# Patient Record
Sex: MALE | Race: WHITE | HISPANIC OR LATINO | Employment: FULL TIME | URBAN - METROPOLITAN AREA
[De-identification: names, ages, dates, MRNs, and addresses within clinical notes are randomized per-mention and may not be internally consistent; named-entity substitution may affect disease eponyms.]

---

## 2017-05-24 ENCOUNTER — OFFICE VISIT (OUTPATIENT)
Dept: URGENT CARE | Facility: PHYSICIAN GROUP | Age: 27
End: 2017-05-24
Payer: COMMERCIAL

## 2017-05-24 VITALS
SYSTOLIC BLOOD PRESSURE: 122 MMHG | OXYGEN SATURATION: 94 % | TEMPERATURE: 97.3 F | HEART RATE: 79 BPM | HEIGHT: 70 IN | DIASTOLIC BLOOD PRESSURE: 74 MMHG

## 2017-05-24 DIAGNOSIS — M62.838 MUSCLE SPASM OF RIGHT SHOULDER: ICD-10-CM

## 2017-05-24 DIAGNOSIS — M77.8 RIGHT SHOULDER TENDINITIS: ICD-10-CM

## 2017-05-24 PROCEDURE — 99214 OFFICE O/P EST MOD 30 MIN: CPT | Performed by: NURSE PRACTITIONER

## 2017-05-24 RX ORDER — HYDROCODONE BITARTRATE AND ACETAMINOPHEN 5; 325 MG/1; MG/1
1-2 TABLET ORAL EVERY 4 HOURS PRN
Qty: 15 TAB | Refills: 0 | Status: SHIPPED | OUTPATIENT
Start: 2017-05-24 | End: 2019-07-01

## 2017-05-24 RX ORDER — CYCLOBENZAPRINE HCL 10 MG
10 TABLET ORAL 3 TIMES DAILY PRN
Qty: 30 TAB | Refills: 0 | Status: SHIPPED | OUTPATIENT
Start: 2017-05-24 | End: 2019-07-01

## 2017-05-24 RX ORDER — NAPROXEN 500 MG/1
500 TABLET ORAL 2 TIMES DAILY WITH MEALS
Qty: 60 TAB | Refills: 1 | Status: SHIPPED | OUTPATIENT
Start: 2017-05-24 | End: 2019-07-01

## 2017-05-24 ASSESSMENT — ENCOUNTER SYMPTOMS
FALLS: 0
FEVER: 0

## 2017-05-24 NOTE — MR AVS SNAPSHOT
"        Fortino Leblanc Freddy   2017 8:05 AM   Office Visit   MRN: [id number]    Department:  [facility]   Dept Phone:  [phone]    Description:  Male : 1990   Provider:  SRIKANTH Valdivia           Reason for Visit     Shoulder Pain right shoulder pain no injury x 1 week       Allergies as of 2017     No Known Allergies      You were diagnosed with     Right shoulder tendinitis   [7317309]       Muscle spasm of right shoulder   [[id number]]         Vital Signs     Blood Pressure Pulse Temperature Height Oxygen Saturation Smoking Status    122/74 mmHg 79 36.3 °C (97.3 °F) 1.778 m (5' 10\") 94% Never Smoker       Basic Information     Date Of Birth Sex Race Ethnicity Preferred Language    1990 Male  or   Origin (Eritrean,Sudanese,Danish,Ryan, etc) English      Health Maintenance        Date Due Completion Dates    IMM DTaP/Tdap/Td Vaccine (1 - Tdap) 2007 ---            Current Immunizations     No immunizations on file.      Below and/or attached are the medications your provider expects you to take. Review all of your home medications and newly ordered medications with your provider and/or pharmacist. Follow medication instructions as directed by your provider and/or pharmacist. Please keep your medication list with you and share with your provider. Update the information when medications are discontinued, doses are changed, or new medications (including over-the-counter products) are added; and carry medication information at all times in the event of emergency situations     Allergies:  No Known Allergies          Medications  Valid as of: May 24, 2017 -  8:47 AM    Generic Name Brand Name Tablet Size Instructions for use    Cyclobenzaprine HCl (Tab) FLEXERIL 10 MG Take 1 Tab by mouth 3 times a day as needed.        Hydrocodone-Acetaminophen (Tab) NORCO 5-325 MG Take 1-2 Tabs by mouth every 6 hours as needed.        Hydrocodone-Acetaminophen (Tab) " NORCO 5-325 MG Take 1-2 Tabs by mouth every four hours as needed.        Naproxen (Tab) NAPROSYN 500 MG Take 1 Tab by mouth 2 times a day, with meals.        Polymyxin B-Trimethoprim (Solution) POLYTRIM 33138-6.1 UNIT/ML-% Place 1 Drop in both eyes every 4 hours.        .                 Medicines prescribed today were sent to:     [facility] [address] [place], [place] - [address]    [address] [facility] [address]    Phone: [phone] Fax: [phone]    Open 24 Hours?: No      Medication refill instructions:       If your prescription bottle indicates you have medication refills left, it is not necessary to call your provider’s office. Please contact your pharmacy and they will refill your medication.    If your prescription bottle indicates you do not have any refills left, you may request refills at any time through one of the following ways: The online Vhoto system (except Urgent Care), by calling your provider’s office, or by asking your pharmacy to contact your provider’s office with a refill request. Medication refills are processed only during regular business hours and may not be available until the next business day. Your provider may request additional information or to have a follow-up visit with you prior to refilling your medication.   *Please Note: Medication refills are assigned a new Rx number when refilled electronically. Your pharmacy may indicate that no refills were authorized even though a new prescription for the same medication is available at the pharmacy. Please request the medicine by name with the pharmacy before contacting your provider for a refill.           Vhoto Access Code: [email or web address]  Expires: 6/23/2017  8:47 AM    Your email address is not on file at [email or web address].  Email Addresses are required for you to sign up for Vhoto, please contact [phone] to verify your personal information and to provide your email address prior to attempting to register for  Spiced Bitst.    Fortino Hayes  [address]  [place] [address]    MyChart  A secure, online tool to manage your health information     [employer]’s Spiced Bitst® is a secure, online tool that connects you to your personalized health information from the privacy of your home -- day or night - making it very easy for you to manage your healthcare. Once the activation process is completed, you can even access your medical information using the SpongeFish anthony, which is available for free in the Apple Anthony store or Google Play store.     To learn more about SpongeFish, visit [email or web address]    There are two levels of access available (as shown below):   My Chart Features  [facility] Primary Care Doctor [facility]  Specialists [facility]  Urgent  Care Non-[facility] Primary Care Doctor   Email your healthcare team securely and privately 24/7 X X X    Manage appointments: schedule your next appointment; view details of past/upcoming appointments X      Request prescription refills. X      View recent personal medical records, including lab and immunizations X X X X   View health record, including health history, allergies, medications X X X X   Read reports about your outpatient visits, procedures, consult and ER notes X X X X   See your discharge summary, which is a recap of your hospital and/or ER visit that includes your diagnosis, lab results, and care plan X X  X     How to register for SpongeFish:  Once your e-mail address has been verified, follow the following steps to sign up for SpongeFish.     1. Go to  [email or web address]  2. Click on the Sign Up Now box, which takes you to the New Member Sign Up page. You will need to provide the following information:  a. Enter your SpongeFish Access Code exactly as it appears at the top of this page. (You will not need to use this code after you’ve completed the sign-up process. If you do not sign up before the expiration date, you must request a new code.)   b. Enter your date of  birth.   c. Enter your home email address.   d. Click Submit, and follow the next screen’s instructions.  3. Create a vivit ID. This will be your vivit login ID and cannot be changed, so think of one that is secure and easy to remember.  4. Create a e-channelt password. You can change your password at any time.  5. Enter your Password Reset Question and Answer. This can be used at a later time if you forget your password.   6. Enter your e-mail address. This allows you to receive e-mail notifications when new information is available in vivit.  7. Click Sign Up. You can now view your health information.    For assistance activating your vivit account, call [phone]

## 2017-05-24 NOTE — PROGRESS NOTES
"Subjective:      Fortino Hayes is a 26 y.o. male who presents with Shoulder Pain            Shoulder Pain  This is a new problem. Episode onset: one week ago. Reports no mechanism of injury, feels like he slept wrong on his shoulder. The problem occurs constantly. The problem has been unchanged. Pertinent negatives include no fever. Associated symptoms comments: Right shoulder pain. The symptoms are aggravated by exertion (any movement of shoulder). He has tried acetaminophen, ice and heat for the symptoms. The treatment provided no relief.       Review of Systems   Constitutional: Negative for fever.   Musculoskeletal: Positive for joint pain (right shoulder). Negative for falls.   All other systems reviewed and are negative.    No past medical history on file. No past surgical history on file.   Social History     Social History   • Marital Status: Single     Spouse Name: N/A   • Number of Children: N/A   • Years of Education: N/A     Occupational History   • Not on file.     Social History Main Topics   • Smoking status: Never Smoker    • Smokeless tobacco: Not on file   • Alcohol Use: 2.4 oz/week     4 Cans of beer per week   • Drug Use: Not on file   • Sexual Activity: Not on file     Other Topics Concern   • Not on file     Social History Narrative          Objective:     /74 mmHg  Pulse 79  Temp(Src) 36.3 °C (97.3 °F)  Ht 1.778 m (5' 10\")  SpO2 94%     Physical Exam   Constitutional: He is oriented to person, place, and time. Vital signs are normal. He appears well-developed and well-nourished.   HENT:   Head: Normocephalic and atraumatic.   Eyes: EOM are normal. Pupils are equal, round, and reactive to light.   Neck: Trachea normal, normal range of motion and phonation normal. Neck supple.       Cardiovascular: Normal rate and regular rhythm.    Pulmonary/Chest: Effort normal.   Musculoskeletal:        Right shoulder: He exhibits decreased range of motion, tenderness, pain, spasm and decreased " strength. He exhibits no effusion, no crepitus and no deformity.   Positive drop arm test  Positive empty can test  Decreased strength against resistance  Decreased ROM in all planes  Distal CMS intact   Neurological: He is alert and oriented to person, place, and time. He has normal strength. No cranial nerve deficit or sensory deficit.   Skin: Skin is warm and dry.   Psychiatric: He has a normal mood and affect. His speech is normal and behavior is normal. Thought content normal.   Vitals reviewed.              Assessment/Plan:     1. Right shoulder tendinitis  - naproxen (NAPROSYN) 500 MG Tab; Take 1 Tab by mouth 2 times a day, with meals.  Dispense: 60 Tab; Refill: 1  - hydrocodone-acetaminophen (NORCO) 5-325 MG Tab per tablet; Take 1-2 Tabs by mouth every four hours as needed.  Dispense: 15 Tab; Refill: 0    2. Muscle spasm of right shoulder  - hydrocodone-acetaminophen (NORCO) 5-325 MG Tab per tablet; Take 1-2 Tabs by mouth every four hours as needed.  Dispense: 15 Tab; Refill: 0  - cyclobenzaprine (FLEXERIL) 10 MG Tab; Take 1 Tab by mouth 3 times a day as needed.  Dispense: 30 Tab; Refill: 0    Due to lack of mechanism of injury, I discussed with patient that I would like to try conservative management first and then imaging later   Ice and heat PRN  Daily shoulder stretches and ROM exercises  Discussed with him to try immobilization of the right shoulder for the next week to see if that improves discomfort  If his pain does not improve, instructed to return to  for imaging, he V/U  Supportive care, differential diagnoses, and indications for immediate follow-up discussed with patient.    Pathogenesis of diagnosis discussed including typical length and natural progression.      Instructed to return to  or nearest emergency department if symptoms fail to improve, for any change in condition, further concerns, or new concerning symptoms.  Patient states understanding of the plan of care and discharge  instructions.

## 2017-05-24 NOTE — Clinical Note
May 24, 2017         Patient: Fortino Hayes   YOB: 1990   Date of Visit: 5/24/2017           To Whom it May Concern:    Fortino Hayes was seen in my clinic on 5/24/2017. He may return to work on 5/24/2017.    If you have any questions or concerns, please don't hesitate to call.        Sincerely,           ERIC Valdivia.  Electronically Signed

## 2017-06-05 ENCOUNTER — OFFICE VISIT (OUTPATIENT)
Dept: URGENT CARE | Facility: PHYSICIAN GROUP | Age: 27
End: 2017-06-05
Payer: COMMERCIAL

## 2017-06-05 VITALS
BODY MASS INDEX: 28.83 KG/M2 | OXYGEN SATURATION: 95 % | WEIGHT: 201.4 LBS | DIASTOLIC BLOOD PRESSURE: 80 MMHG | RESPIRATION RATE: 16 BRPM | HEIGHT: 70 IN | TEMPERATURE: 98.1 F | HEART RATE: 97 BPM | SYSTOLIC BLOOD PRESSURE: 126 MMHG

## 2017-06-05 DIAGNOSIS — R06.02 SOB (SHORTNESS OF BREATH): ICD-10-CM

## 2017-06-05 PROCEDURE — 99214 OFFICE O/P EST MOD 30 MIN: CPT | Performed by: FAMILY MEDICINE

## 2017-06-05 RX ORDER — ALBUTEROL SULFATE 90 UG/1
2 AEROSOL, METERED RESPIRATORY (INHALATION) EVERY 6 HOURS PRN
Qty: 8.5 G | Refills: 0 | Status: SHIPPED | OUTPATIENT
Start: 2017-06-05 | End: 2019-07-01

## 2017-06-05 RX ORDER — IPRATROPIUM BROMIDE AND ALBUTEROL SULFATE 2.5; .5 MG/3ML; MG/3ML
3 SOLUTION RESPIRATORY (INHALATION) ONCE
Status: COMPLETED | OUTPATIENT
Start: 2017-06-05 | End: 2017-06-05

## 2017-06-05 RX ORDER — ALBUTEROL SULFATE 2.5 MG/3ML
2.5 SOLUTION RESPIRATORY (INHALATION) ONCE
Status: COMPLETED | OUTPATIENT
Start: 2017-06-05 | End: 2017-06-05

## 2017-06-05 RX ORDER — AZITHROMYCIN 250 MG/1
TABLET, FILM COATED ORAL
Qty: 6 TAB | Refills: 0 | Status: SHIPPED | OUTPATIENT
Start: 2017-06-05 | End: 2019-07-01

## 2017-06-05 RX ADMIN — ALBUTEROL SULFATE 2.5 MG: 2.5 SOLUTION RESPIRATORY (INHALATION) at 18:29

## 2017-06-05 RX ADMIN — IPRATROPIUM BROMIDE AND ALBUTEROL SULFATE 3 ML: 2.5; .5 SOLUTION RESPIRATORY (INHALATION) at 17:53

## 2017-06-28 ASSESSMENT — ENCOUNTER SYMPTOMS
FEVER: 0
ABDOMINAL PAIN: 0
WHEEZING: 1
SPUTUM PRODUCTION: 1
SORE THROAT: 0
SHORTNESS OF BREATH: 1

## 2017-06-28 NOTE — PROGRESS NOTES
"Subjective:      Fortino Hayes is a 26 y.o. male who presents with Shortness of Breath            Shortness of Breath  This is a new problem. The current episode started in the past 7 days. The problem occurs constantly. The problem has been rapidly worsening. Associated symptoms include sputum production and wheezing. Pertinent negatives include no abdominal pain, fever or sore throat.       Review of Systems   Constitutional: Negative for fever.   HENT: Negative for sore throat.    Respiratory: Positive for sputum production, shortness of breath and wheezing.    Gastrointestinal: Negative for abdominal pain.     No Known Allergies      Objective:     /80 mmHg  Pulse 97  Temp(Src) 36.7 °C (98.1 °F)  Resp 16  Ht 1.778 m (5' 10\")  Wt 91.354 kg (201 lb 6.4 oz)  BMI 28.90 kg/m2  SpO2 95%     Physical Exam   Constitutional: He is oriented to person, place, and time. He appears well-developed and well-nourished. No distress.   HENT:   Head: Normocephalic and atraumatic.   Eyes: Conjunctivae and EOM are normal. Pupils are equal, round, and reactive to light.   Cardiovascular: Normal rate and regular rhythm.    No murmur heard.  Pulmonary/Chest: Effort normal. No respiratory distress. He has wheezes. He has no rales.   Abdominal: Soft. He exhibits no distension. There is no tenderness.   Neurological: He is alert and oriented to person, place, and time. He has normal reflexes. No sensory deficit.   Skin: Skin is warm and dry.   Psychiatric: He has a normal mood and affect.               Assessment/Plan:     1. SOB (shortness of breath)  Differential diagnosis, natural history, supportive care, and indications for immediate follow-up discussed.   - ipratropium-albuterol (DUONEB) nebulizer solution 3 mL; 3 mL by Nebulization route Once.  - albuterol 108 (90 BASE) MCG/ACT Aero Soln inhalation aerosol; Inhale 2 Puffs by mouth every 6 hours as needed for Shortness of Breath.  Dispense: 8.5 g; Refill: 0  - " azithromycin (ZITHROMAX) 250 MG Tab; Take 2 tablets by mouth on day one. Take one tablet by mouth the remaining days until gone  Dispense: 6 Tab; Refill: 0  - albuterol (PROVENTIL) 2.5mg/3ml nebulizer solution 2.5 mg; 3 mL by Nebulization route Once.

## 2017-12-23 ENCOUNTER — OFFICE VISIT (OUTPATIENT)
Dept: URGENT CARE | Facility: PHYSICIAN GROUP | Age: 27
End: 2017-12-23
Payer: COMMERCIAL

## 2017-12-23 ENCOUNTER — HOSPITAL ENCOUNTER (OUTPATIENT)
Dept: RADIOLOGY | Facility: MEDICAL CENTER | Age: 27
End: 2017-12-23
Attending: NURSE PRACTITIONER
Payer: COMMERCIAL

## 2017-12-23 VITALS
HEART RATE: 107 BPM | DIASTOLIC BLOOD PRESSURE: 82 MMHG | BODY MASS INDEX: 30.06 KG/M2 | TEMPERATURE: 97.8 F | SYSTOLIC BLOOD PRESSURE: 130 MMHG | RESPIRATION RATE: 18 BRPM | OXYGEN SATURATION: 96 % | HEIGHT: 70 IN | WEIGHT: 210 LBS

## 2017-12-23 DIAGNOSIS — R50.9 FEVER, UNSPECIFIED FEVER CAUSE: ICD-10-CM

## 2017-12-23 DIAGNOSIS — J20.9 ACUTE BRONCHITIS, UNSPECIFIED ORGANISM: ICD-10-CM

## 2017-12-23 DIAGNOSIS — J45.20 MILD INTERMITTENT ASTHMA WITHOUT COMPLICATION: ICD-10-CM

## 2017-12-23 LAB
FLUAV+FLUBV AG SPEC QL IA: NORMAL
INT CON NEG: NORMAL
INT CON POS: NORMAL

## 2017-12-23 PROCEDURE — 99214 OFFICE O/P EST MOD 30 MIN: CPT | Performed by: NURSE PRACTITIONER

## 2017-12-23 PROCEDURE — 71020 DX-CHEST-2 VIEWS: CPT

## 2017-12-23 PROCEDURE — 87804 INFLUENZA ASSAY W/OPTIC: CPT | Performed by: NURSE PRACTITIONER

## 2017-12-23 RX ORDER — ALBUTEROL SULFATE 90 UG/1
2 AEROSOL, METERED RESPIRATORY (INHALATION) EVERY 6 HOURS PRN
Qty: 8.5 G | Refills: 2 | Status: SHIPPED | OUTPATIENT
Start: 2017-12-23 | End: 2019-07-01

## 2017-12-23 RX ORDER — PREDNISONE 20 MG/1
TABLET ORAL
Qty: 10 TAB | Refills: 0 | Status: SHIPPED | OUTPATIENT
Start: 2017-12-23 | End: 2018-09-19

## 2017-12-23 ASSESSMENT — ENCOUNTER SYMPTOMS
FEVER: 0
NAUSEA: 0
SORE THROAT: 0
WHEEZING: 0
SHORTNESS OF BREATH: 1
SPUTUM PRODUCTION: 1
COUGH: 1
HEADACHES: 1
CHILLS: 1
ORTHOPNEA: 0
MYALGIAS: 1
EYE DISCHARGE: 0
DIARRHEA: 0

## 2017-12-23 NOTE — PROGRESS NOTES
Subjective:      Fortino Hayes is a 27 y.o. male who presents with Flu Like Symptoms (x3days cough fever, body aches)            HPI This is a new problem. 27 year old male presenting with cough and congestion as well as chest tightness and chills. He denies fever today. He has no sore throat, nausea or vomiting. Has some chest wall pain on left when coughing. Denies wheezing but has shortness of breath. He has been taking albuterol with no relief. History relevant for asthma.  Patient has no known allergies.  Current Outpatient Prescriptions on File Prior to Visit   Medication Sig Dispense Refill   • albuterol 108 (90 BASE) MCG/ACT Aero Soln inhalation aerosol Inhale 2 Puffs by mouth every 6 hours as needed for Shortness of Breath. 8.5 g 0   • azithromycin (ZITHROMAX) 250 MG Tab Take 2 tablets by mouth on day one. Take one tablet by mouth the remaining days until gone 6 Tab 0   • naproxen (NAPROSYN) 500 MG Tab Take 1 Tab by mouth 2 times a day, with meals. 60 Tab 1   • hydrocodone-acetaminophen (NORCO) 5-325 MG Tab per tablet Take 1-2 Tabs by mouth every four hours as needed. 15 Tab 0   • cyclobenzaprine (FLEXERIL) 10 MG Tab Take 1 Tab by mouth 3 times a day as needed. 30 Tab 0   • polymixin-trimethoprim (POLYTRIM) 51378-4.1 UNIT/ML-% Solution Place 1 Drop in both eyes every 4 hours. 10 mL 0   • hydrocodone-acetaminophen (NORCO) 5-325 MG Tab per tablet Take 1-2 Tabs by mouth every 6 hours as needed. 15 Tab 0     No current facility-administered medications on file prior to visit.      Social History     Social History   • Marital status: Single     Spouse name: N/A   • Number of children: N/A   • Years of education: N/A     Occupational History   • Not on file.     Social History Main Topics   • Smoking status: Never Smoker   • Smokeless tobacco: Not on file   • Alcohol use 2.4 oz/week     4 Cans of beer per week   • Drug use: Unknown   • Sexual activity: Not on file     Other Topics Concern   • Not on file  "    Social History Narrative   • No narrative on file     family history is not on file.      Review of Systems   Constitutional: Positive for chills and malaise/fatigue. Negative for fever.   HENT: Positive for congestion. Negative for sore throat.    Eyes: Negative for discharge.   Respiratory: Positive for cough, sputum production and shortness of breath. Negative for wheezing.    Cardiovascular: Negative for chest pain and orthopnea.   Gastrointestinal: Negative for diarrhea and nausea.   Musculoskeletal: Positive for myalgias.   Neurological: Positive for headaches.   Endo/Heme/Allergies: Negative for environmental allergies.          Objective:     /82   Pulse (!) 107   Temp 36.6 °C (97.8 °F)   Resp 18   Ht 1.778 m (5' 10\")   Wt 95.3 kg (210 lb)   SpO2 96%   BMI 30.13 kg/m²      Physical Exam   Constitutional: He is oriented to person, place, and time. He appears well-developed and well-nourished. No distress.   HENT:   Head: Normocephalic and atraumatic.   Right Ear: External ear and ear canal normal. Tympanic membrane is not injected and not perforated. No middle ear effusion.   Left Ear: External ear and ear canal normal. Tympanic membrane is not injected and not perforated.  No middle ear effusion.   Nose: Mucosal edema present.   Mouth/Throat: Posterior oropharyngeal erythema present. No oropharyngeal exudate.   Eyes: Conjunctivae are normal. Right eye exhibits no discharge. Left eye exhibits no discharge.   Neck: Normal range of motion. Neck supple.   Cardiovascular: Normal rate, regular rhythm and normal heart sounds.    No murmur heard.  Pulmonary/Chest: Effort normal and breath sounds normal. No respiratory distress. He has no wheezes.   Musculoskeletal: Normal range of motion.   Normal movement of all 4 extremities.   Lymphadenopathy:     He has no cervical adenopathy.        Right: No supraclavicular adenopathy present.        Left: No supraclavicular adenopathy present.   Neurological: " He is alert and oriented to person, place, and time. Gait normal.   Skin: Skin is warm and dry.   Psychiatric: He has a normal mood and affect. His behavior is normal. Thought content normal.               Assessment/Plan:     1. Fever, unspecified fever cause  POCT Influenza A/B    DX-CHEST-2 VIEWS   2. Acute bronchitis, unspecified organism  albuterol 108 (90 Base) MCG/ACT Aero Soln inhalation aerosol    predniSONE (DELTASONE) 20 MG Tab   3. Mild intermittent asthma without complication       X-ray and flu negative.   This is a viral event, no indication for antibiotics.   Refill on albuterol and given 5 days of prednisone.  Reassurance that chest wall pain likely fatigue of muscle.  Differential diagnosis, natural history, supportive care, and indications for immediate follow-up discussed at length.

## 2018-09-19 ENCOUNTER — OFFICE VISIT (OUTPATIENT)
Dept: URGENT CARE | Facility: CLINIC | Age: 28
End: 2018-09-19
Payer: COMMERCIAL

## 2018-09-19 VITALS
HEIGHT: 70 IN | HEART RATE: 87 BPM | RESPIRATION RATE: 13 BRPM | BODY MASS INDEX: 30.78 KG/M2 | TEMPERATURE: 97.9 F | DIASTOLIC BLOOD PRESSURE: 82 MMHG | OXYGEN SATURATION: 94 % | WEIGHT: 215 LBS | SYSTOLIC BLOOD PRESSURE: 124 MMHG

## 2018-09-19 DIAGNOSIS — J02.9 EXUDATIVE PHARYNGITIS: ICD-10-CM

## 2018-09-19 DIAGNOSIS — B27.90 INFECTIOUS MONONUCLEOSIS WITHOUT COMPLICATION, INFECTIOUS MONONUCLEOSIS DUE TO UNSPECIFIED ORGANISM: Primary | ICD-10-CM

## 2018-09-19 LAB
HETEROPH AB SER QL LA: POSITIVE
INT CON NEG: NEGATIVE
INT CON NEG: NEGATIVE
INT CON POS: POSITIVE
INT CON POS: POSITIVE
S PYO AG THROAT QL: NEGATIVE

## 2018-09-19 PROCEDURE — 86308 HETEROPHILE ANTIBODY SCREEN: CPT | Performed by: PHYSICIAN ASSISTANT

## 2018-09-19 PROCEDURE — 99214 OFFICE O/P EST MOD 30 MIN: CPT | Performed by: PHYSICIAN ASSISTANT

## 2018-09-19 PROCEDURE — 87880 STREP A ASSAY W/OPTIC: CPT | Performed by: PHYSICIAN ASSISTANT

## 2018-09-19 RX ORDER — METHYLPREDNISOLONE 4 MG/1
TABLET ORAL
Qty: 21 TAB | Refills: 0 | Status: SHIPPED | OUTPATIENT
Start: 2018-09-19 | End: 2019-07-01

## 2018-09-19 RX ORDER — AMOXICILLIN AND CLAVULANATE POTASSIUM 875; 125 MG/1; MG/1
1 TABLET, FILM COATED ORAL 2 TIMES DAILY
Qty: 14 TAB | Refills: 0 | Status: SHIPPED | OUTPATIENT
Start: 2018-09-19 | End: 2018-09-19 | Stop reason: CLARIF

## 2018-09-19 NOTE — PROGRESS NOTES
Subjective:      Pt is a 28 y.o. male who presents with Sore Throat            HPI  PT presents to  clinic today complaining of sore throat, fevers, chills, body aches, watery eyes, pressure in ears, cough, fatigue, runny nose. PT denies CP, SOB, NVD, abdominal pain, joint pain. PT states these symptoms began around 3 days ago. PT states the pain is a 7/10 with swallowing, aching in nature and worse at night.  Pt has not taken any RX medications for this condition. The pt's medication list, problem list, and allergies have been evaluated and reviewed during today's visit.      PMH:  Negative per pt.      PSH:  Negative per pt.      Fam Hx:  the patient's family history is not pertinent to their current complaint      Soc HX:  Social History     Social History   • Marital status: Single     Spouse name: N/A   • Number of children: N/A   • Years of education: N/A     Occupational History   • Not on file.     Social History Main Topics   • Smoking status: Never Smoker   • Smokeless tobacco: Never Used   • Alcohol use 2.4 oz/week     4 Cans of beer per week   • Drug use: Unknown   • Sexual activity: Not on file     Other Topics Concern   • Not on file     Social History Narrative   • No narrative on file         Medications:    Current Outpatient Prescriptions:   •  amoxicillin-clavulanate (AUGMENTIN) 875-125 MG Tab, Take 1 Tab by mouth 2 times a day for 7 days., Disp: 14 Tab, Rfl: 0  •  MethylPREDNISolone (MEDROL DOSEPAK) 4 MG Tablet Therapy Pack, Use as directed, Disp: 21 Tab, Rfl: 0  •  albuterol 108 (90 Base) MCG/ACT Aero Soln inhalation aerosol, Inhale 2 Puffs by mouth every 6 hours as needed for Shortness of Breath., Disp: 8.5 g, Rfl: 2  •  albuterol 108 (90 BASE) MCG/ACT Aero Soln inhalation aerosol, Inhale 2 Puffs by mouth every 6 hours as needed for Shortness of Breath., Disp: 8.5 g, Rfl: 0  •  azithromycin (ZITHROMAX) 250 MG Tab, Take 2 tablets by mouth on day one. Take one tablet by mouth the remaining days  "until gone, Disp: 6 Tab, Rfl: 0  •  naproxen (NAPROSYN) 500 MG Tab, Take 1 Tab by mouth 2 times a day, with meals., Disp: 60 Tab, Rfl: 1  •  hydrocodone-acetaminophen (NORCO) 5-325 MG Tab per tablet, Take 1-2 Tabs by mouth every four hours as needed., Disp: 15 Tab, Rfl: 0  •  cyclobenzaprine (FLEXERIL) 10 MG Tab, Take 1 Tab by mouth 3 times a day as needed., Disp: 30 Tab, Rfl: 0  •  polymixin-trimethoprim (POLYTRIM) 87740-6.1 UNIT/ML-% Solution, Place 1 Drop in both eyes every 4 hours., Disp: 10 mL, Rfl: 0  •  hydrocodone-acetaminophen (NORCO) 5-325 MG Tab per tablet, Take 1-2 Tabs by mouth every 6 hours as needed., Disp: 15 Tab, Rfl: 0      Allergies:  Patient has no known allergies.    ROS  Constitutional: Positive for chills, fevers, body aches and malaise/fatigue.   HENT: Positive for congestion and sore throat. Negative for ear pain.    Eyes: Negative for blurred vision, double vision and photophobia.   Respiratory: NEG for cough  Cardiovascular: Negative for chest pain and palpitations.   Gastrointestinal: Negative for nausea, vomiting, abdominal pain, diarrhea and constipation.   Genitourinary: Negative for dysuria and flank pain.   Musculoskeletal: Negative for falls and myalgias.   Skin: Negative for itching and rash.   Neurological: Positive for headaches. Negative for dizziness and tingling.   Endo/Heme/Allergies: Does not bruise/bleed easily.   Psychiatric/Behavioral: Negative for depression. The patient is not nervous/anxious.             Objective:     /82 (BP Location: Right arm, Patient Position: Sitting, BP Cuff Size: Adult)   Pulse 87   Temp 36.6 °C (97.9 °F) (Temporal)   Resp 13   Ht 1.778 m (5' 10\")   Wt 97.5 kg (215 lb)   SpO2 94%   BMI 30.85 kg/m²      Physical Exam      Constitutional: PT is oriented to person, place, and time. PT appears well-developed and well-nourished. No distress.   HENT:   Head: Normocephalic and atraumatic.   Right Ear: Hearing, tympanic membrane, external " ear and ear canal normal.   Left Ear: Hearing, tympanic membrane, external ear and ear canal normal.   Nose: Mucosal edema, rhinorrhea and sinus tenderness present. Right sinus exhibits frontal sinus tenderness. Left sinus exhibits frontal sinus tenderness.   Mouth/Throat: Uvula is midline. Mucous membranes are pale. Posterior oropharyngeal edema and posterior oropharyngeal erythema with exudate noted on exam.   Eyes: Conjunctivae normal and EOM are normal. Pupils are equal, round, and reactive to light.   Neck: Normal range of motion. Neck supple. No thyromegaly present.   Cardiovascular: Normal rate, regular rhythm, normal heart sounds and intact distal pulses.  Exam reveals no gallop and no friction rub.    No murmur heard.  Pulmonary/Chest: Effort normal and breath sounds normal. No respiratory distress. PT has no wheezes. PT has no rales. PT exhibits no tenderness.   Abdominal: Soft. Bowel sounds are normal. PT exhibits no distension and no mass. There is no tenderness. There is no rebound and no guarding.   Musculoskeletal: Normal range of motion. PT exhibits no edema and no tenderness.   Lymphadenopathy:     PT has no cervical adenopathy.   Neurological: PT is alert and oriented to person, place, and time. PT displays normal reflexes. No cranial nerve deficit. PT exhibits normal muscle tone. Coordination normal.   Skin: Skin is warm and dry. No rash noted. No erythema.   Psychiatric: PT has a normal mood and affect. PT behavior is normal. Judgment and thought content normal.          Assessment/Plan:     1. Infectious mononucleosis without complication, infectious mononucleosis due to unspecified organism    2, Exudative pharyngitis  Back-up abx if symptoms do not improve in 2-3 days. PT on clinical presentation has exudate on oropharynx and it's possible beyond the strep A testing that this could be a different type of strep (C/G) or A. haemolyticum       - MethylPREDNISolone (MEDROL DOSEPAK) 4 MG Tablet  Therapy Pack; Use as directed  Dispense: 21 Tab; Refill: 0  - POCT Rapid Strep A-->NEG  - POCT Mononucleosis (mono)-->POS    Rest, fluids encouraged.  OTC decongestant for congestion  Note given for work.  AVS with medical info given.  Pt was in full understanding and agreement with the plan.  Follow-up as needed if symptoms worsen or fail to improve.

## 2018-09-19 NOTE — LETTER
September 19, 2018       Patient: Fortino Hayes   YOB: 1990   Date of Visit: 9/19/2018         To Whom It May Concern:    It is my medical opinion that Fortino Hayes may be excused from work for the dates of 9/19/18-9/21/18.      If you have any questions or concerns, please don't hesitate to call [phone]          Sincerely,          Earnest Muro P.A.-C.  Electronically Signed

## 2018-09-19 NOTE — PATIENT INSTRUCTIONS
Infectious Mononucleosis  Infectious mononucleosis is a viral infection. It is often referred to as “mono.” It causes symptoms that affect various areas of the body, including the throat, upper air passages, and lymph glands. The liver or spleen may also be affected.  The virus spreads from person to person (is contagious) through close contact. The illness is usually not serious, and it typically goes away in 2-4 weeks without treatment. In rare cases, symptoms can be more severe and last longer, sometimes up to several months.  What are the causes?  This condition is commonly caused by the Nadir-Barr virus. This virus spreads through:  · Contact with an infected person’s saliva or other bodily fluids, often through:  ¨ Kissing.  ¨ Sexual contact.  ¨ Coughing.  ¨ Sneezing.  · Sharing utensils or drinking glasses that were recently used by an infected person.  · Blood transfusions.  · Organ transplantation.  What increases the risk?  You are more likely to develop this condition if:  · You are 15-24 years old.  What are the signs or symptoms?  Symptoms of this condition usually appear 4-6 weeks after infection. Symptoms may develop slowly and occur at different times. Common symptoms include:  · Sore throat.  · Headache.  · Extreme fatigue.  · Muscle aches.  · Swollen glands.  · Fever.  · Poor appetite.  · Rash.  Other symptoms include:  · Enlarged liver or spleen.  · Nausea.  · Abdominal pain.  How is this diagnosed?  This condition may be diagnosed based on:  · Your medical history.  · Your symptoms.  · A physical exam.  · Blood tests to confirm the diagnosis.  How is this treated?  There is no cure for this condition. Infectious mononucleosis usually goes away on its own with time. Treatment can help relieve symptoms and may include:  · Taking medicines to relieve pain and fever.  · Drinking plenty of fluids.  · Getting a lot of rest.  · Medicine (corticosteroids)to reduce swelling. This may be used if swelling  in the throat causes breathing or swallowing problems.  In some severe cases, treatment has to be given in a hospital.  Follow these instructions at home:  Medicines  · Take over-the-counter and prescription medicines only as told by your health care provider.  · Do not take ampicillin or amoxicillin. This may cause a rash.  · If you are under 18, do not take aspirin because of the association with Reye syndrome.  Activity  · Rest as needed.  · Do not participate in any of the following activities until your health care provider approves:  ¨ Contact sports. You may need to wait at least a month before participating in sports.  ¨ Exercise that requires a lot of energy.  ¨ Heavy lifting.  · Gradually resume your normal activities after your fever is gone, or when your health care provider tells you that you can. Be sure to rest when you get tired.  General instructions  · Avoid kissing or sharing utensils or drinking glasses until your health care provider tells you that you are no longer contagious.  · Drink enough fluid to keep your urine clear or pale yellow.  · Do not drink alcohol.  · If you have a sore throat:  ¨ Gargle with a salt-water mixture 3-4 times a day or as needed. To make a salt-water mixture, completely dissolve ½-1 tsp of salt in 1 cup of warm water.  ¨ Eat soft foods. Cold foods such as ice cream or frozen ice pops can soothe a sore throat.  ¨ Try sucking on hard candy.  · Wash your hands often with soap and water to avoid spreading the infection. If soap and water are not available, use hand .  How is this prevented?  · Avoid contact with people who are infected with mononucleosis. An infected person may not always appear ill, but he or she can still spread the virus.  · Avoid sharing utensils, drinking glasses, or toothbrushes.  · Wash your hands frequently with soap and water. If soap and water are not available, use hand .  · Use the inside of your elbow to cover your mouth  "when coughing or sneezing.  Contact a health care provider if:  · Your fever is not gone after 10 days.  · You have swollen lymph nodes that are not back to normal after 4 weeks.  · Your activity level is not back to normal after 2 months.  · Your skin or the white parts of your eyes turn yellow (jaundice).  · You have constipation. This may mean that you have:  ¨ Fewer bowel movements in a week than normal.  ¨ Difficulty having a bowel movement.  ¨ Stools that are dry, hard, or larger than normal.  Get help right away if:  · You have severe pain in your abdomen or shoulder.  · You are drooling.  · You have trouble swallowing.  · You have trouble breathing.  · You develop a stiff neck.  · You develop a severe headache.  · You cannot stop vomiting.  · You have jerky movements that you cannot control (seizures).  · You are confused.  · You have trouble with balance.  · Your nose or gums begin to bleed.  · You have signs of dehydration. These may include:  ¨ Weakness.  ¨ Sunken eyes.  ¨ Pale skin.  ¨ Dry mouth.  ¨ Rapid breathing or pulse.  Summary  · Infectious mononucleosis, or \"mono,\" is an infection caused by the Nadir-Barr virus.  · The virus that causes this condition is spread through bodily fluids. The virus is most commonly spread by kissing or sharing drinks or utensils with an infected person.  · You are more likely to develop this infection if you are 15-24 years old.  · Symptoms of this condition can include sore throat, headache, fever, swollen glands, muscle aches, extreme fatigue, and swollen liver or spleen.  · There is no cure for this condition. The goal of treatment is to help relieve symptoms. Treatment may include drinking plenty of water, getting a lot of rest, and taking pain relievers.  This information is not intended to replace advice given to you by your health care provider. Make sure you discuss any questions you have with your health care provider.  Document Released: 12/15/2001 " Document Revised: 09/05/2017 Document Reviewed: 09/05/2017  [employer] Patient Education © 2017 [employer]

## 2018-12-24 ENCOUNTER — OFFICE VISIT (OUTPATIENT)
Dept: URGENT CARE | Facility: MEDICAL CENTER | Age: 28
End: 2018-12-24
Payer: COMMERCIAL

## 2018-12-24 VITALS
SYSTOLIC BLOOD PRESSURE: 122 MMHG | TEMPERATURE: 97.4 F | DIASTOLIC BLOOD PRESSURE: 78 MMHG | HEART RATE: 104 BPM | BODY MASS INDEX: 30.92 KG/M2 | HEIGHT: 70 IN | WEIGHT: 216 LBS | OXYGEN SATURATION: 93 %

## 2018-12-24 DIAGNOSIS — J02.9 SORE THROAT: ICD-10-CM

## 2018-12-24 DIAGNOSIS — R68.89 FLU-LIKE SYMPTOMS: ICD-10-CM

## 2018-12-24 LAB
FLUAV+FLUBV AG SPEC QL IA: NORMAL
INT CON NEG: NORMAL
INT CON NEG: NORMAL
INT CON POS: NORMAL
INT CON POS: NORMAL
S PYO AG THROAT QL: NORMAL

## 2018-12-24 PROCEDURE — 87804 INFLUENZA ASSAY W/OPTIC: CPT | Performed by: NURSE PRACTITIONER

## 2018-12-24 PROCEDURE — 99214 OFFICE O/P EST MOD 30 MIN: CPT | Performed by: NURSE PRACTITIONER

## 2018-12-24 PROCEDURE — 87880 STREP A ASSAY W/OPTIC: CPT | Performed by: NURSE PRACTITIONER

## 2018-12-24 RX ORDER — OSELTAMIVIR PHOSPHATE 75 MG/1
75 CAPSULE ORAL 2 TIMES DAILY
Qty: 10 CAP | Refills: 0 | Status: SHIPPED | OUTPATIENT
Start: 2018-12-24 | End: 2018-12-29

## 2018-12-24 ASSESSMENT — ENCOUNTER SYMPTOMS
HEADACHES: 1
TROUBLE SWALLOWING: 1
CHILLS: 1
COUGH: 1
SORE THROAT: 1
SWOLLEN GLANDS: 1
FEVER: 1

## 2018-12-24 NOTE — PROGRESS NOTES
Subjective:      Fortino Hayes is a 28 y.o. male who presents with Pharyngitis (chills yesterday, 103 fever, throat pain)    History reviewed. No pertinent past medical history.  Social History     Social History   • Marital status: Single     Spouse name: N/A   • Number of children: N/A   • Years of education: N/A     Occupational History   • Not on file.     Social History Main Topics   • Smoking status: Never Smoker   • Smokeless tobacco: Never Used   • Alcohol use 2.4 oz/week     4 Cans of beer per week      Comment: occasionally   • Drug use: Unknown   • Sexual activity: Not on file     Other Topics Concern   • Not on file     Social History Narrative   • No narrative on file     Family History   Problem Relation Age of Onset   • Heart Disease Neg Hx    • Stroke Neg Hx      Allergies: Patient has no known allergies.      Patient is a 28-year-old male who presents today with complaint of sore throat and fever, aches, and chills.  Symptoms started over the last 48 hours.  Reports T-max of 103.  Has had cough and nasal congestion as well.  He denies any shortness of breath or difficulty breathing.  No other ill family members at this time.        Pharyngitis    This is a new problem. The current episode started in the past 7 days. The problem has been unchanged. Neither side of throat is experiencing more pain than the other. The maximum temperature recorded prior to his arrival was 103 - 104 F. Associated symptoms include congestion, coughing, ear pain, headaches, swollen glands and trouble swallowing. He has tried nothing for the symptoms. The treatment provided no relief.       Review of Systems   Constitutional: Positive for chills, fever and malaise/fatigue.   HENT: Positive for congestion, ear pain, sore throat and trouble swallowing.    Respiratory: Positive for cough.    Skin: Negative.    Neurological: Positive for headaches.   All other systems reviewed and are negative.         Objective:     BP  "122/78 (BP Location: Left arm, Patient Position: Sitting, BP Cuff Size: Adult)   Pulse (!) 104   Temp 36.3 °C (97.4 °F) (Temporal)   Ht 1.778 m (5' 10\")   Wt 98 kg (216 lb)   SpO2 93%   BMI 30.99 kg/m²      Physical Exam   Constitutional: He is oriented to person, place, and time. He appears well-developed and well-nourished.   HENT:   Head: Normocephalic.   Right Ear: External ear normal.   Left Ear: External ear normal.   Nose: Nose normal.   Clear postnasal drainage, oropharynx is slightly red.   Eyes: Pupils are equal, round, and reactive to light. Conjunctivae and EOM are normal.   Neck: Normal range of motion. Neck supple.   Cardiovascular: Normal rate and regular rhythm.    Pulmonary/Chest: Effort normal and breath sounds normal.   Musculoskeletal: Normal range of motion.   Neurological: He is alert and oriented to person, place, and time.   Skin: Skin is warm and dry. Capillary refill takes less than 2 seconds. No rash noted.   Psychiatric: He has a normal mood and affect. His behavior is normal. Judgment and thought content normal.   Vitals reviewed.      poct strep: negative   poct influenza: negative           Assessment/Plan:     1. Sore throat  2. Flu-like symptoms     -tylenol/motrin PRN  -tamiflu  -warm saltwater gargles  -push fluids  -humidifier  -ER precautions for respiratory distress       "

## 2019-07-01 ENCOUNTER — OFFICE VISIT (OUTPATIENT)
Dept: URGENT CARE | Facility: MEDICAL CENTER | Age: 29
End: 2019-07-01
Payer: COMMERCIAL

## 2019-07-01 VITALS
DIASTOLIC BLOOD PRESSURE: 78 MMHG | OXYGEN SATURATION: 93 % | SYSTOLIC BLOOD PRESSURE: 116 MMHG | WEIGHT: 216 LBS | HEART RATE: 82 BPM | TEMPERATURE: 98.7 F | RESPIRATION RATE: 16 BRPM | HEIGHT: 70 IN | BODY MASS INDEX: 30.92 KG/M2

## 2019-07-01 DIAGNOSIS — J22 LOWER RESP. TRACT INFECTION: ICD-10-CM

## 2019-07-01 PROCEDURE — 99214 OFFICE O/P EST MOD 30 MIN: CPT | Performed by: NURSE PRACTITIONER

## 2019-07-01 RX ORDER — BENZONATATE 200 MG/1
200 CAPSULE ORAL 3 TIMES DAILY PRN
Qty: 30 CAP | Refills: 0 | Status: SHIPPED | OUTPATIENT
Start: 2019-07-01 | End: 2019-10-16

## 2019-07-01 RX ORDER — AZITHROMYCIN 250 MG/1
TABLET, FILM COATED ORAL
Qty: 6 TAB | Refills: 0 | Status: SHIPPED | OUTPATIENT
Start: 2019-07-01 | End: 2019-10-16

## 2019-07-01 RX ORDER — METHYLPREDNISOLONE 4 MG/1
4 TABLET ORAL DAILY
Qty: 1 KIT | Refills: 0 | Status: SHIPPED | OUTPATIENT
Start: 2019-07-01 | End: 2019-10-16

## 2019-07-01 RX ORDER — AMOXICILLIN AND CLAVULANATE POTASSIUM 500; 125 MG/1; MG/1
1 TABLET, FILM COATED ORAL 3 TIMES DAILY
COMMUNITY
End: 2019-07-01

## 2019-07-01 ASSESSMENT — ENCOUNTER SYMPTOMS
CHILLS: 0
FEVER: 0
DIZZINESS: 0
SORE THROAT: 1
WHEEZING: 1
BLURRED VISION: 0
VOMITING: 0
STRIDOR: 0
CONSTIPATION: 0
WEIGHT LOSS: 0
ABDOMINAL PAIN: 0
COUGH: 1
PALPITATIONS: 0
DIARRHEA: 0
HEADACHES: 0
SPUTUM PRODUCTION: 1
SWEATS: 0
DOUBLE VISION: 0
NAUSEA: 0
MUSCULOSKELETAL NEGATIVE: 1

## 2019-07-01 NOTE — PROGRESS NOTES
Subjective:   Fortino Hayes is a 28 y.o. male who presents for Cough (chest congestion, throat is scratchy, x 3 weeks)        Cough   This is a new problem. The current episode started 1 to 4 weeks ago. The problem has been unchanged. The problem occurs every few minutes. The cough is productive of sputum. Associated symptoms include nasal congestion, a sore throat and wheezing. Pertinent negatives include no chest pain, chills, ear congestion, ear pain, fever, headaches, postnasal drip, rash, sweats or weight loss. The symptoms are aggravated by lying down. He has tried OTC cough suppressant for the symptoms. The treatment provided mild relief. His past medical history is significant for asthma. There is no history of environmental allergies or pneumonia.        Review of Systems   Constitutional: Negative for chills, fever and weight loss.   HENT: Positive for congestion and sore throat. Negative for ear discharge, ear pain and postnasal drip.    Eyes: Negative for blurred vision and double vision.   Respiratory: Positive for cough, sputum production and wheezing. Negative for stridor.    Cardiovascular: Negative for chest pain and palpitations.   Gastrointestinal: Negative for abdominal pain, constipation, diarrhea, nausea and vomiting.   Musculoskeletal: Negative.    Skin: Negative.  Negative for itching and rash.   Neurological: Negative for dizziness and headaches.   Endo/Heme/Allergies: Negative for environmental allergies.   All other systems reviewed and are negative.    PMH:  has no past medical history of Cancer (HCC).  MEDS:   Current Outpatient Prescriptions:   •  azithromycin (ZITHROMAX) 250 MG Tab, Take two tabs po day one followed by one tab po day two through five with food, Disp: 6 Tab, Rfl: 0  •  methylPREDNISolone (MEDROL DOSEPAK) 4 MG Tablet Therapy Pack, Take 1 Tab by mouth every day., Disp: 1 Kit, Rfl: 0  •  benzonatate (TESSALON) 200 MG capsule, Take 1 Cap by mouth 3 times a day as  "needed., Disp: 30 Cap, Rfl: 0  ALLERGIES: No Known Allergies  SURGHX: History reviewed. No pertinent surgical history.  SOCHX:  reports that he has never smoked. He has never used smokeless tobacco. He reports that he drinks about 2.4 oz of alcohol per week .  FH: Family history was reviewed, no pertinent findings to report     Objective:   /78   Pulse 82   Temp 37.1 °C (98.7 °F)   Resp 16   Ht 1.778 m (5' 10\")   Wt 98 kg (216 lb)   SpO2 93%   BMI 30.99 kg/m²   Physical Exam   Constitutional: He is oriented to person, place, and time. He appears well-developed and well-nourished. No distress.   HENT:   Head: Normocephalic.   Right Ear: Hearing, tympanic membrane and ear canal normal. Tympanic membrane is not erythematous. No middle ear effusion.   Left Ear: Hearing, tympanic membrane and ear canal normal. Tympanic membrane is not erythematous.  No middle ear effusion.   Nose: Nose normal. No rhinorrhea. Right sinus exhibits no maxillary sinus tenderness and no frontal sinus tenderness. Left sinus exhibits no maxillary sinus tenderness and no frontal sinus tenderness.   Mouth/Throat: Oropharynx is clear and moist and mucous membranes are normal. No posterior oropharyngeal edema or posterior oropharyngeal erythema.   Eyes: Pupils are equal, round, and reactive to light. Conjunctivae, EOM and lids are normal.   Neck: Normal range of motion. No thyromegaly present.   Cardiovascular: Normal rate, regular rhythm and normal heart sounds.    Pulmonary/Chest: Effort normal. No accessory muscle usage. No apnea, no tachypnea and no bradypnea. No respiratory distress. He has no decreased breath sounds. He has wheezes in the right upper field and the left upper field.   Lymphadenopathy:        Head (right side): Submandibular adenopathy present. No tonsillar adenopathy present.        Head (left side): No submandibular and no tonsillar adenopathy present.   Neurological: He is alert and oriented to person, place, " and time.   Skin: Skin is warm and dry. No rash noted. He is not diaphoretic.   Psychiatric: He has a normal mood and affect. His speech is normal and behavior is normal. Judgment and thought content normal.   Vitals reviewed.        Assessment/Plan:   Assessment    1. Lower resp. tract infection  - azithromycin (ZITHROMAX) 250 MG Tab; Take two tabs po day one followed by one tab po day two through five with food  Dispense: 6 Tab; Refill: 0  - methylPREDNISolone (MEDROL DOSEPAK) 4 MG Tablet Therapy Pack; Take 1 Tab by mouth every day.  Dispense: 1 Kit; Refill: 0  - benzonatate (TESSALON) 200 MG capsule; Take 1 Cap by mouth 3 times a day as needed.  Dispense: 30 Cap; Refill: 0    With wheezing in office and patient states with albuterol inhaler at home that he can go home and use for wheezing. At home instructions for inhaler use discussed.  Patient encouraged to increase clear liquid intake    Differential diagnosis, natural history, supportive care, and indications for immediate follow-up discussed.

## 2019-10-16 ENCOUNTER — OFFICE VISIT (OUTPATIENT)
Dept: URGENT CARE | Facility: MEDICAL CENTER | Age: 29
End: 2019-10-16
Payer: COMMERCIAL

## 2019-10-16 ENCOUNTER — HOSPITAL ENCOUNTER (OUTPATIENT)
Dept: RADIOLOGY | Facility: MEDICAL CENTER | Age: 29
End: 2019-10-16
Attending: FAMILY MEDICINE
Payer: COMMERCIAL

## 2019-10-16 VITALS
HEIGHT: 71 IN | HEART RATE: 87 BPM | DIASTOLIC BLOOD PRESSURE: 80 MMHG | SYSTOLIC BLOOD PRESSURE: 122 MMHG | BODY MASS INDEX: 30.66 KG/M2 | RESPIRATION RATE: 18 BRPM | OXYGEN SATURATION: 95 % | WEIGHT: 219 LBS | TEMPERATURE: 97.5 F

## 2019-10-16 DIAGNOSIS — J45.901 MILD ASTHMA WITH EXACERBATION, UNSPECIFIED WHETHER PERSISTENT: ICD-10-CM

## 2019-10-16 DIAGNOSIS — R68.89 FLU-LIKE SYMPTOMS: ICD-10-CM

## 2019-10-16 LAB
FLUAV+FLUBV AG SPEC QL IA: NEGATIVE
INT CON NEG: NORMAL
INT CON POS: NORMAL

## 2019-10-16 PROCEDURE — 99214 OFFICE O/P EST MOD 30 MIN: CPT | Mod: 25 | Performed by: FAMILY MEDICINE

## 2019-10-16 PROCEDURE — 94640 AIRWAY INHALATION TREATMENT: CPT | Performed by: FAMILY MEDICINE

## 2019-10-16 PROCEDURE — 71046 X-RAY EXAM CHEST 2 VIEWS: CPT

## 2019-10-16 PROCEDURE — 87804 INFLUENZA ASSAY W/OPTIC: CPT | Performed by: FAMILY MEDICINE

## 2019-10-16 RX ORDER — BENZONATATE 100 MG/1
100 CAPSULE ORAL 3 TIMES DAILY PRN
Qty: 30 CAP | Refills: 0 | Status: SHIPPED | OUTPATIENT
Start: 2019-10-16 | End: 2020-07-14

## 2019-10-16 RX ORDER — PREDNISONE 20 MG/1
TABLET ORAL
Qty: 10 TAB | Refills: 0 | Status: SHIPPED | OUTPATIENT
Start: 2019-10-16 | End: 2020-07-14

## 2019-10-16 RX ORDER — ALBUTEROL SULFATE 90 UG/1
2 AEROSOL, METERED RESPIRATORY (INHALATION) EVERY 4 HOURS PRN
Qty: 1 INHALER | Refills: 0 | Status: SHIPPED | OUTPATIENT
Start: 2019-10-16 | End: 2020-07-14

## 2019-10-16 RX ORDER — ALBUTEROL SULFATE 2.5 MG/3ML
2.5 SOLUTION RESPIRATORY (INHALATION) ONCE
Status: COMPLETED | OUTPATIENT
Start: 2019-10-16 | End: 2019-10-16

## 2019-10-16 RX ADMIN — ALBUTEROL SULFATE 2.5 MG: 2.5 SOLUTION RESPIRATORY (INHALATION) at 10:41

## 2019-10-16 NOTE — LETTER
October 16, 2019         Patient: Fortino Silvestre   YOB: 1990   Date of Visit: 10/16/2019           To Whom it May Concern:    Fortino Silvestre was seen in my clinic on 10/16/2019. He may return to work on 10/19/2019..    If you have any questions or concerns, please don't hesitate to call.        Sincerely,           Max Carias M.D.  Electronically Signed

## 2019-10-16 NOTE — PROGRESS NOTES
"Subjective:      Fortino Silvestre is a 29 y.o. male who presents with Fever (x2days fever, congestion, headaches)            This is a new problem.  29-year-old male who reports history of mild intermittent asthma presenting for 3-day history of cough congestion and fever, achy and fatigue.  He has been using albuterol inhaler, last use yesterday.  He has noticed some wheezing.  No other ill contacts or travel history.      Review of Systems   All other systems reviewed and are negative.         Objective:     /80   Pulse 87   Temp 36.4 °C (97.5 °F) (Temporal)   Resp 18   Ht 1.803 m (5' 11\")   Wt 99.3 kg (219 lb)   SpO2 95%   BMI 30.54 kg/m²      Physical Exam   Constitutional: He is oriented to person, place, and time. He appears well-developed and well-nourished.  Non-toxic appearance. No distress.   HENT:   Head: Normocephalic and atraumatic.   Right Ear: Tympanic membrane, external ear and ear canal normal.   Left Ear: Tympanic membrane, external ear and ear canal normal.   Nose: Rhinorrhea present.   Mouth/Throat: Uvula is midline and oropharynx is clear and moist. No oral lesions. No trismus in the jaw. No uvula swelling. No oropharyngeal exudate, posterior oropharyngeal edema, posterior oropharyngeal erythema or tonsillar abscesses. No tonsillar exudate.   Eyes: Conjunctivae are normal.   Neck: Neck supple.   Cardiovascular: Normal rate and regular rhythm. Exam reveals no gallop and no friction rub.   No murmur heard.  Pulmonary/Chest: Effort normal. No stridor. No respiratory distress. He has decreased breath sounds in the right lower field. He has wheezes. He has rhonchi. He has no rales.   Wheezing and rhonchi significantly improved after neb.   Lymphadenopathy:     He has no cervical adenopathy.   Neurological: He is alert and oriented to person, place, and time.   Skin: Skin is warm. No rash noted. He is not diaphoretic. No erythema. No pallor.   Psychiatric: He has a normal mood and " affect.        Chest x-ray was negative for acute abnormalities on my read       Assessment/Plan:   ASSESSMENT:PLAN:  1. Mild asthma with exacerbation, unspecified whether persistent  - predniSONE (DELTASONE) 20 MG Tab; Take 40mg daily x 5 days  Dispense: 10 Tab; Refill: 0  - albuterol 108 (90 Base) MCG/ACT Aero Soln inhalation aerosol; Inhale 2 Puffs by mouth every four hours as needed (wheezing).  Dispense: 1 Inhaler; Refill: 0    2. Flu-like symptoms  - albuterol (PROVENTIL) 2.5mg/3ml nebulizer solution 2.5 mg  - POCT Influenza A/B  - DX-CHEST-2 VIEWS; Future  - benzonatate (TESSALON) 100 MG Cap; Take 1 Cap by mouth 3 times a day as needed for Cough.  Dispense: 30 Cap; Refill: 0      Likely a viral illness, no evidence of pneumonia on chest x-ray.  Mild asthma exacerbation  Refilled albuterol.  Oral steroid daily for 5 days.  OTC antipyretic discussed.  Plan per orders and instructions  Warning signs reviewed

## 2020-07-14 ENCOUNTER — OFFICE VISIT (OUTPATIENT)
Dept: MEDICAL GROUP | Facility: PHYSICIAN GROUP | Age: 30
End: 2020-07-14
Payer: COMMERCIAL

## 2020-07-14 VITALS
WEIGHT: 214.8 LBS | RESPIRATION RATE: 16 BRPM | TEMPERATURE: 98.8 F | OXYGEN SATURATION: 97 % | DIASTOLIC BLOOD PRESSURE: 78 MMHG | SYSTOLIC BLOOD PRESSURE: 126 MMHG | HEART RATE: 88 BPM | HEIGHT: 71 IN | BODY MASS INDEX: 30.07 KG/M2

## 2020-07-14 DIAGNOSIS — N50.89 TESTICULAR LUMP: ICD-10-CM

## 2020-07-14 DIAGNOSIS — G43.101 MIGRAINE WITH AURA AND WITH STATUS MIGRAINOSUS, NOT INTRACTABLE: ICD-10-CM

## 2020-07-14 PROBLEM — G43.109 MIGRAINE WITH AURA: Status: ACTIVE | Noted: 2020-07-14

## 2020-07-14 PROCEDURE — 99214 OFFICE O/P EST MOD 30 MIN: CPT | Performed by: FAMILY MEDICINE

## 2020-07-14 RX ORDER — AMITRIPTYLINE HYDROCHLORIDE 10 MG/1
10 TABLET, FILM COATED ORAL
Qty: 30 TAB | Refills: 2 | Status: SHIPPED | OUTPATIENT
Start: 2020-07-14 | End: 2020-08-05

## 2020-07-14 ASSESSMENT — PATIENT HEALTH QUESTIONNAIRE - PHQ9: CLINICAL INTERPRETATION OF PHQ2 SCORE: 0

## 2020-07-14 NOTE — PROGRESS NOTES
"Subjective:     Chief Complaint   Patient presents with   • Establish Care     migraines        HPI:   Fortino presents today to discuss the following.  Prior PCP: None.    Migraine with aura  This is a chronic condition.    Symptom onset: He has had migraines for many years  Current symptoms: Last migraine was a couple of days. He gets them 4x weekly.   Since onset symptoms are: Unchanged  Modifying factors: Mom has migraines too   Treatments tried: Advil and rest in a dark room   Associated symptoms: Denies any      Testicular lump  This is a chronic problem  The patient noted a lump to his right testicle 2 weeks ago  It is laterally to the testicle  It is asymptomatic   He is sexually active with his wife  Denies any penile discharge.       No past medical history on file.    Social History     Tobacco Use   • Smoking status: Never Smoker   • Smokeless tobacco: Never Used   Substance Use Topics   • Alcohol use: Yes     Alcohol/week: 2.4 oz     Types: 4 Cans of beer per week     Comment: occasionally   • Drug use: Not on file       Current Outpatient Medications Ordered in Epic   Medication Sig Dispense Refill   • amitriptyline (ELAVIL) 10 MG Tab Take 1 Tab by mouth every bedtime. 30 Tab 2     No current Epic-ordered facility-administered medications on file.        Allergies:  Patient has no known allergies.    Health Maintenance: Completed    ROS:  Gen: no fevers/chills, no changes in weight  Eyes: no changes in vision  ENT: no sore throat, no hearing loss, no bloody nose  Pulm: no sob, no cough  CV: no chest pain, no palpitations  GI: no nausea/vomiting, no diarrhea      Objective:     Exam:  /78 (BP Location: Left arm, Patient Position: Sitting, BP Cuff Size: Adult)   Pulse 88   Temp 37.1 °C (98.8 °F) (Temporal)   Resp 16   Ht 1.803 m (5' 11\")   Wt 97.4 kg (214 lb 12.8 oz)   SpO2 97%   BMI 29.96 kg/m²  Body mass index is 29.96 kg/m².    Constitutional: Alert, no distress, well-groomed.  Skin: Warm, " dry, good turgor, no rashes in visible areas.  Eye: Equal, round and reactive, conjunctiva clear, lids normal.  ENMT: Lips without lesions, good dentition, moist mucous membranes.  Neck: Trachea midline, no masses, no thyromegaly.  Respiratory: Unlabored respiratory effort, no cough.  MSK: Normal gait, moves all extremities.  : Inspection of his testicular region shows a right scrotal mass measuring approximately 1 cm in diameter.  It is movable.  Questionably subcutaneous in nature.  Neuro: Grossly non-focal.   Psych: Alert and oriented x3, normal affect and mood.          Assessment & Plan:     29 y.o. male with the following -     1. Migraine with aura and with status migrainosus, not intractable  This is a chronic, unchanged condition.  The patient has a longstanding history of migraines.  He gets them very frequently up to 4 times weekly.  He only takes ibuprofen and rest for treatment.  He has not tried any other medications.  Given the high frequency of his migraines I would like to do a trial of amitriptyline to prevent his headaches.  The patient is in agreement with plan.  We will commence with amitriptyline 10 mg nightly.  - amitriptyline (ELAVIL) 10 MG Tab; Take 1 Tab by mouth every bedtime.  Dispense: 30 Tab; Refill: 2    2. Testicular lump  This is a new problem.  The patient has been complaining of a right testicular lump.  On physical exam I question whether this is subcutaneous or an actual testicular lump.  I would like to pursue a scrotal ultrasound for further analysis.  Return precautions were given today.  - [id number]; Future        Return in about 3 months (around 10/14/2020) for FU on testicle lump.    Please note that this dictation was created using voice recognition software. I have made every reasonable attempt to correct obvious errors, but I expect that there are errors of grammar and possibly content that I did not discover before finalizing the note.

## 2020-07-14 NOTE — ASSESSMENT & PLAN NOTE
This is a chronic condition.    Symptom onset: He has had migraines for many years  Current symptoms: Last migraine was a couple of days. He gets them 4x weekly.   Since onset symptoms are: Unchanged  Modifying factors: Mom has migraines too   Treatments tried: Advil and rest in a dark room   Associated symptoms: Denies any

## 2020-07-14 NOTE — ASSESSMENT & PLAN NOTE
This is a chronic problem  The patient noted a lump to his right testicle 2 weeks ago  It is laterally to the testicle  It is asymptomatic   He is sexually active with his wife  Denies any penile discharge.

## 2020-07-27 ENCOUNTER — HOSPITAL ENCOUNTER (OUTPATIENT)
Dept: RADIOLOGY | Facility: MEDICAL CENTER | Age: 30
End: 2020-07-27
Attending: FAMILY MEDICINE
Payer: COMMERCIAL

## 2020-07-27 DIAGNOSIS — N50.89 TESTICULAR LUMP: ICD-10-CM

## 2020-07-27 PROCEDURE — 76870 US EXAM SCROTUM: CPT

## 2020-08-05 ENCOUNTER — OFFICE VISIT (OUTPATIENT)
Dept: URGENT CARE | Facility: PHYSICIAN GROUP | Age: 30
End: 2020-08-05
Payer: COMMERCIAL

## 2020-08-05 VITALS
SYSTOLIC BLOOD PRESSURE: 126 MMHG | WEIGHT: 214.8 LBS | TEMPERATURE: 97.6 F | RESPIRATION RATE: 16 BRPM | HEIGHT: 71 IN | BODY MASS INDEX: 30.07 KG/M2 | OXYGEN SATURATION: 95 % | HEART RATE: 78 BPM | DIASTOLIC BLOOD PRESSURE: 80 MMHG

## 2020-08-05 DIAGNOSIS — G43.109 MIGRAINE WITH AURA AND WITHOUT STATUS MIGRAINOSUS, NOT INTRACTABLE: ICD-10-CM

## 2020-08-05 DIAGNOSIS — R11.0 NAUSEA: ICD-10-CM

## 2020-08-05 PROCEDURE — 99214 OFFICE O/P EST MOD 30 MIN: CPT | Mod: 25 | Performed by: NURSE PRACTITIONER

## 2020-08-05 RX ORDER — ONDANSETRON 4 MG/1
4 TABLET, ORALLY DISINTEGRATING ORAL EVERY 6 HOURS PRN
Qty: 15 TAB | Refills: 0 | Status: SHIPPED | OUTPATIENT
Start: 2020-08-05 | End: 2020-10-06

## 2020-08-05 RX ORDER — KETOROLAC TROMETHAMINE 30 MG/ML
30 INJECTION, SOLUTION INTRAMUSCULAR; INTRAVENOUS ONCE
Status: COMPLETED | OUTPATIENT
Start: 2020-08-05 | End: 2020-08-05

## 2020-08-05 RX ADMIN — KETOROLAC TROMETHAMINE 30 MG: 30 INJECTION, SOLUTION INTRAMUSCULAR; INTRAVENOUS at 15:16

## 2020-08-05 ASSESSMENT — ENCOUNTER SYMPTOMS
PHOTOPHOBIA: 1
CHILLS: 0
EYE DISCHARGE: 0
WHEEZING: 0
BLURRED VISION: 0
DIZZINESS: 0
SENSORY CHANGE: 0
SHORTNESS OF BREATH: 0
EYE PAIN: 0
HEADACHES: 1
DOUBLE VISION: 0
FEVER: 0
COUGH: 0
TINGLING: 0

## 2020-08-05 ASSESSMENT — VISUAL ACUITY: OU: 1

## 2020-08-05 NOTE — PROGRESS NOTES
Subjective:   Fortino Silvestre  is a 30 y.o. male who presents for Dizziness (headaches, was seen at the doctor two weeks ago and the medication is not helping, feels like its getting worse, x2 weeks )        30-year-old male patient reports urgent care for recurrent migraine headaches with aura specifically on the right-hand side.  Patient states he recently went to his primary care provider who started him on amitriptyline and he needs a referral but does not feel like they are helpful and might have even worsened his headaches.  Patient states he has them 4 times a week goes to bed and wakes up early morning with a very bad headache, nausea with associated vomiting and aura on the right-hand side.  Patient has not seen neurology for this.  Usually takes Tylenol and ibuprofen with no relief of symptoms.  Denies visual changes during headache.  States they are usually located right above his right eye as well as in the middle f his right temple area.  Patient states he did get his eyes checked and is not straining at this point.  Does not need glasses.    Review of Systems   Constitutional: Negative for chills, fever and malaise/fatigue.   HENT: Negative for ear pain and tinnitus.    Eyes: Positive for photophobia. Negative for blurred vision, double vision, pain and discharge.   Respiratory: Negative for cough, shortness of breath and wheezing.    Neurological: Positive for headaches. Negative for dizziness, tingling and sensory change.     History reviewed. No pertinent past medical history. History reviewed. No pertinent surgical history.   Social History     Socioeconomic History   • Marital status: Single     Spouse name: Not on file   • Number of children: Not on file   • Years of education: Not on file   • Highest education level: Not on file   Occupational History   • Not on file   Social Needs   • Financial resource strain: Not on file   • Food insecurity     Worry: Not on file     Inability: Not  "on file   • Transportation needs     Medical: Not on file     Non-medical: Not on file   Tobacco Use   • Smoking status: Never Smoker   • Smokeless tobacco: Never Used   Substance and Sexual Activity   • Alcohol use: Yes     Alcohol/week: 2.4 oz     Types: 4 Cans of beer per week     Comment: occasionally   • Drug use: Not on file   • Sexual activity: Not on file   Lifestyle   • Physical activity     Days per week: Not on file     Minutes per session: Not on file   • Stress: Not on file   Relationships   • Social connections     Talks on phone: Not on file     Gets together: Not on file     Attends [employer] service: Not on file     Active member of club or organization: Not on file     Attends meetings of clubs or organizations: Not on file     Relationship status: Not on file   • Intimate partner violence     Fear of current or ex partner: Not on file     Emotionally abused: Not on file     Physically abused: Not on file     Forced sexual activity: Not on file   Other Topics Concern   • Not on file   Social History Narrative   • Not on file    Patient has no known allergies.       Objective:   /80 (BP Location: Right arm, Patient Position: Sitting, BP Cuff Size: Adult)   Pulse 78   Temp 36.4 °C (97.6 °F) (Temporal)   Resp 16   Ht 1.803 m (5' 11\")   Wt 97.4 kg (214 lb 12.8 oz)   SpO2 95%   BMI 29.96 kg/m²   Physical Exam  Vitals signs reviewed.   Constitutional:       Appearance: Normal appearance.   HENT:      Right Ear: Tympanic membrane, ear canal and external ear normal.      Left Ear: Tympanic membrane, ear canal and external ear normal.      Nose: Nose normal.      Mouth/Throat:      Mouth: Mucous membranes are moist.   Eyes:      General: Lids are normal. Vision grossly intact. Gaze aligned appropriately.      Extraocular Movements: Extraocular movements intact.      Conjunctiva/sclera: Conjunctivae normal.      Pupils: Pupils are equal, round, and reactive to light.      Visual Fields: Right " eye visual fields normal and left eye visual fields normal.   Cardiovascular:      Rate and Rhythm: Normal rate and regular rhythm.      Heart sounds: Normal heart sounds.   Pulmonary:      Effort: Pulmonary effort is normal.      Breath sounds: Normal breath sounds.   Skin:     General: Skin is warm.   Neurological:      General: No focal deficit present.      Mental Status: He is alert.      GCS: GCS eye subscore is 4. GCS verbal subscore is 5. GCS motor subscore is 6.      Cranial Nerves: Cranial nerves are intact.      Sensory: Sensation is intact.      Motor: Motor function is intact.      Coordination: Coordination is intact.      Gait: Gait is intact.   Psychiatric:         Mood and Affect: Mood normal.         Behavior: Behavior normal.         Thought Content: Thought content normal.         Judgment: Judgment normal.           Assessment/Plan:      1. Migraine with aura and without status migrainosus, not intractable  - ketorolac (TORADOL) injection 30 mg  - REFERRAL TO NEUROLOGY    2. Nausea  - ondansetron (ZOFRAN ODT) 4 MG TABLET DISPERSIBLE; Take 1 Tab by mouth every 6 hours as needed for Nausea.  Dispense: 15 Tab; Refill: 0    Discussed physical examination findings are consistent with chronic migraine with aura without status migrainosus is not intractable.  Will provide patient with referral to neurology for further work-up and evaluation.  Did offer to refill amitriptyline but patient states he does not feel he gets helpful.  Advised him to take over-the-counter 800 mg of ibuprofen as well as Excedrin Migraine when he feels one coming on also to increase his fluids using electrolyte averages and possibly drink a caffeinated beverage.  Also provided patient with Zofran for nausea.  Also provided patient with a Toradol injection IM for headache relief.  Supportive care, differential diagnoses, and indications for immediate follow-up discussed with patient.    Pathogenesis of diagnosis discussed  including typical length and natural progression. Patient expresses understanding and agrees to plan.    Instructed patient to return to clinic for worsening symptoms or symptoms that persist for 7 to 10 days     Please note that this dictation was created using voice recognition software. I have made every reasonable attempt to correct obvious errors, but I expect that there are errors of grammar and possibly content that I did not discover before finalizing the note.

## 2020-09-17 ENCOUNTER — PATIENT MESSAGE (OUTPATIENT)
Dept: MEDICAL GROUP | Facility: PHYSICIAN GROUP | Age: 30
End: 2020-09-17

## 2020-09-17 DIAGNOSIS — Z20.822 CLOSE EXPOSURE TO COVID-19 VIRUS: ICD-10-CM

## 2020-10-06 ENCOUNTER — OFFICE VISIT (OUTPATIENT)
Dept: URGENT CARE | Facility: PHYSICIAN GROUP | Age: 30
End: 2020-10-06
Payer: COMMERCIAL

## 2020-10-06 VITALS
HEART RATE: 77 BPM | SYSTOLIC BLOOD PRESSURE: 130 MMHG | HEIGHT: 70 IN | RESPIRATION RATE: 16 BRPM | WEIGHT: 216 LBS | TEMPERATURE: 97.5 F | OXYGEN SATURATION: 99 % | BODY MASS INDEX: 30.92 KG/M2 | DIASTOLIC BLOOD PRESSURE: 80 MMHG

## 2020-10-06 DIAGNOSIS — G43.009 MIGRAINE WITHOUT AURA AND WITHOUT STATUS MIGRAINOSUS, NOT INTRACTABLE: ICD-10-CM

## 2020-10-06 DIAGNOSIS — R11.2 NAUSEA AND VOMITING, INTRACTABILITY OF VOMITING NOT SPECIFIED, UNSPECIFIED VOMITING TYPE: ICD-10-CM

## 2020-10-06 PROCEDURE — 99214 OFFICE O/P EST MOD 30 MIN: CPT | Performed by: NURSE PRACTITIONER

## 2020-10-06 RX ORDER — KETOROLAC TROMETHAMINE 30 MG/ML
30 INJECTION, SOLUTION INTRAMUSCULAR; INTRAVENOUS ONCE
Status: COMPLETED | OUTPATIENT
Start: 2020-10-06 | End: 2020-10-06

## 2020-10-06 RX ORDER — ONDANSETRON 4 MG/1
4 TABLET, ORALLY DISINTEGRATING ORAL EVERY 6 HOURS PRN
Qty: 15 TAB | Refills: 0 | Status: SHIPPED | OUTPATIENT
Start: 2020-10-06 | End: 2020-10-15

## 2020-10-06 RX ADMIN — KETOROLAC TROMETHAMINE 30 MG: 30 INJECTION, SOLUTION INTRAMUSCULAR; INTRAVENOUS at 12:02

## 2020-10-06 ASSESSMENT — ENCOUNTER SYMPTOMS
DIZZINESS: 0
MIGRAINE HEADACHES: 1
HEADACHES: 1
FEVER: 0
EYE PAIN: 0
NAUSEA: 1
VOMITING: 1
WEIGHT LOSS: 0
SHORTNESS OF BREATH: 0
COUGH: 0
ABDOMINAL PAIN: 0
WHEEZING: 0
DIARRHEA: 0
PHOTOPHOBIA: 0
DOUBLE VISION: 0
BLURRED VISION: 0

## 2020-10-06 ASSESSMENT — VISUAL ACUITY: OU: 1

## 2020-10-06 NOTE — PROGRESS NOTES
Subjective:   Fortino Silvestre  is a 30 y.o. male who presents for Headache (Terrible headaches )        Headache   This is a recurrent problem. The problem occurs intermittently. The problem has been unchanged. The pain is located in the right unilateral, frontal, retro-orbital and temporal region. The pain does not radiate. The pain quality is similar to prior headaches. The quality of the pain is described as sharp and stabbing. Associated symptoms include nausea and vomiting. Pertinent negatives include no abdominal pain, blurred vision, coughing, dizziness, ear pain, eye pain, fever, hearing loss, photophobia, tinnitus or weight loss. Nothing aggravates the symptoms. His past medical history is significant for migraine headaches.      30-year-old nontoxic-appearing male reports urgent care for recurrent problem.  Was seen in the urgent careFor similar symptoms on 8-5-2020 and advised to follow-up with neurology and referral was placed.  Patient states that the referral department never called him although it looks like a letter was sent but patient states he never got it.  Still currently taking amitriptyline which he does not feel like is helping he still having right-sided sharp piercing migraines without auras to the right side of his face and above his right eye.  Has had his eyes checked and states that everything is fine went to the chiropractor and got adjusted and his symptoms are still persisting with at least 16 headaches a month.  When patient has a headache he treats with ibuprofen, Coca-Cola with no relief of symptoms.  Patient states he also usually wakes up in the middle of the night with these gets very hot and sweaty with associated nausea, vomits and then he is without pain for a day or so.    Review of Systems   Constitutional: Negative for fever, malaise/fatigue and weight loss.   HENT: Negative for congestion, ear pain, hearing loss and tinnitus.    Eyes: Negative for blurred  vision, double vision, photophobia and pain.   Respiratory: Negative for cough, shortness of breath and wheezing.    Gastrointestinal: Positive for nausea and vomiting. Negative for abdominal pain and diarrhea.   Neurological: Positive for headaches. Negative for dizziness.     History reviewed. No pertinent past medical history. History reviewed. No pertinent surgical history.   Social History     Socioeconomic History   • Marital status: Single     Spouse name: Not on file   • Number of children: Not on file   • Years of education: Not on file   • Highest education level: Not on file   Occupational History   • Not on file   Social Needs   • Financial resource strain: Not on file   • Food insecurity     Worry: Not on file     Inability: Not on file   • Transportation needs     Medical: Not on file     Non-medical: Not on file   Tobacco Use   • Smoking status: Never Smoker   • Smokeless tobacco: Never Used   Substance and Sexual Activity   • Alcohol use: Yes     Alcohol/week: 2.4 oz     Types: 4 Cans of beer per week     Comment: occasionally   • Drug use: Not on file   • Sexual activity: Not on file   Lifestyle   • Physical activity     Days per week: Not on file     Minutes per session: Not on file   • Stress: Not on file   Relationships   • Social connections     Talks on phone: Not on file     Gets together: Not on file     Attends Muslim service: Not on file     Active member of club or organization: Not on file     Attends meetings of clubs or organizations: Not on file     Relationship status: Not on file   • Intimate partner violence     Fear of current or ex partner: Not on file     Emotionally abused: Not on file     Physically abused: Not on file     Forced sexual activity: Not on file   Other Topics Concern   • Not on file   Social History Narrative   • Not on file    Patient has no known allergies.       Objective:   /80 (BP Location: Left arm, Patient Position: Sitting, BP Cuff Size: Adult)    "Pulse 77   Temp 36.4 °C (97.5 °F) (Temporal)   Resp 16   Ht 1.778 m (5' 10\")   Wt 98 kg (216 lb)   SpO2 99%   BMI 30.99 kg/m²   Physical Exam  Vitals signs reviewed.   Constitutional:       Appearance: Normal appearance.   HENT:      Right Ear: Tympanic membrane, ear canal and external ear normal.      Left Ear: Tympanic membrane, ear canal and external ear normal.      Mouth/Throat:      Mouth: Mucous membranes are moist.   Eyes:      General: Lids are normal. Vision grossly intact. Gaze aligned appropriately.      Extraocular Movements: Extraocular movements intact.      Conjunctiva/sclera: Conjunctivae normal.      Pupils: Pupils are equal, round, and reactive to light.      Visual Fields: Right eye visual fields normal and left eye visual fields normal.   Cardiovascular:      Rate and Rhythm: Normal rate and regular rhythm.      Heart sounds: Normal heart sounds.   Pulmonary:      Effort: Pulmonary effort is normal.      Breath sounds: Normal breath sounds.   Skin:     General: Skin is warm.   Neurological:      Mental Status: He is alert and oriented to person, place, and time.      GCS: GCS eye subscore is 4. GCS verbal subscore is 5. GCS motor subscore is 6.      Cranial Nerves: Cranial nerves are intact.      Sensory: Sensation is intact.      Motor: Motor function is intact.      Coordination: Coordination is intact.      Gait: Gait is intact.   Psychiatric:         Mood and Affect: Mood normal.         Behavior: Behavior normal.         Thought Content: Thought content normal.         Judgment: Judgment normal.           Assessment/Plan:   1. Migraine without aura and without status migrainosus, not intractable  - ketorolac (TORADOL) injection 30 mg  - ondansetron (ZOFRAN ODT) 4 MG TABLET DISPERSIBLE; Take 1 Tab by mouth every 6 hours as needed for Nausea.  Dispense: 15 Tab; Refill: 0    2. Nausea and vomiting, intractability of vomiting not specified, unspecified vomiting type  - ketorolac (TORADOL) " injection 30 mg  - ondansetron (ZOFRAN ODT) 4 MG TABLET DISPERSIBLE; Take 1 Tab by mouth every 6 hours as needed for Nausea.  Dispense: 15 Tab; Refill: 0    Discussed physical examination findings as well as ongoing migraine headaches needs to be worked up with both primary and neurology.  Provided patient with contact information for the referral department to call and schedule an appointment.  Advised him that if it takes more than 3 months I would like him to contact me via my chart and I will talk to the referral department to try to get him in sooner elsewhere outside of [facility].  Discussed abortive care including continuation of ibuprofen, Coca-Cola, and suggested to add electrolyte enriched beverages as well as Excedrin Migraine.  Also advised patient to follow-up with primary care as soon as he can because he needs to have a discussion regarding possibility of changing prophylactic migraine medication.    Supportive care, differential diagnoses, and indications for immediate follow-up discussed with patient.    Pathogenesis of diagnosis discussed including typical length and natural progression. Patient expresses understanding and agrees to plan.    Instructed patient to return to clinic for worsening symptoms or symptoms that persist for 7 to 10 days     Please note that this dictation was created using voice recognition software. I have made every reasonable attempt to correct obvious errors, but I expect that there are errors of grammar and possibly content that I did not discover before finalizing the note.  Note electronically signed by MAREN Penn

## 2020-10-15 ENCOUNTER — OFFICE VISIT (OUTPATIENT)
Dept: MEDICAL GROUP | Facility: PHYSICIAN GROUP | Age: 30
End: 2020-10-15
Payer: COMMERCIAL

## 2020-10-15 VITALS
RESPIRATION RATE: 16 BRPM | TEMPERATURE: 97.8 F | HEART RATE: 74 BPM | HEIGHT: 70 IN | DIASTOLIC BLOOD PRESSURE: 82 MMHG | SYSTOLIC BLOOD PRESSURE: 128 MMHG | BODY MASS INDEX: 31.87 KG/M2 | WEIGHT: 222.6 LBS | OXYGEN SATURATION: 97 %

## 2020-10-15 DIAGNOSIS — N50.89 TESTICULAR LUMP: ICD-10-CM

## 2020-10-15 DIAGNOSIS — G43.119 INTRACTABLE MIGRAINE WITH AURA WITHOUT STATUS MIGRAINOSUS: ICD-10-CM

## 2020-10-15 PROCEDURE — 99214 OFFICE O/P EST MOD 30 MIN: CPT | Performed by: FAMILY MEDICINE

## 2020-10-15 RX ORDER — SUMATRIPTAN 100 MG/1
100 TABLET, FILM COATED ORAL
Qty: 10 TAB | Refills: 3 | Status: SHIPPED | OUTPATIENT
Start: 2020-10-15 | End: 2021-02-23 | Stop reason: SINTOL

## 2020-10-15 NOTE — PROGRESS NOTES
Subjective:     Chief Complaint   Patient presents with   • Follow-Up   • Results       HPI:   Fortino presents today to discuss the following.  Prior PCP:     Testicular lump  This is a chronic problem  The patient noted a lump to his right testicle 3 months ago  It is laterally to the right testicle  It is asymptomatic   He is sexually active with his wife  Denies any penile discharge.     I ordered US and possibly epididymitis which appear to be resolving according to the radiologist.  The patient mentions that the previously palpated lump is very difficult to palpate now and continues to be asymptomatic.    Migraine with aura  This is a chronic condition.     Symptom onset: He has had migraines for many years  Current symptoms: Last migraine was a couple of days. He gets them 4x weekly.   Since onset symptoms are: Unchanged  Modifying factors: Mom has migraines too. We had discussed starting amitriptyline. He tried them but he began to experience worsening headaches.   Treatments tried: Advil and rest in a dark room   Associated symptoms: Denies any      No past medical history on file.    Social History     Tobacco Use   • Smoking status: Never Smoker   • Smokeless tobacco: Never Used   Substance Use Topics   • Alcohol use: Yes     Alcohol/week: 2.4 oz     Types: 4 Cans of beer per week     Comment: occasionally   • Drug use: Not on file       Current Outpatient Medications Ordered in Epic   Medication Sig Dispense Refill   • sumatriptan (IMITREX) 100 MG tablet Take 1 Tab by mouth Once PRN for Migraine for up to 1 dose. 10 Tab 3     No current Epic-ordered facility-administered medications on file.        Allergies:  Patient has no known allergies.    Health Maintenance: Completed    ROS:  Gen: no fevers/chills, no changes in weight  Eyes: no changes in vision  ENT: no sore throat, no hearing loss, no bloody nose  Pulm: no sob, no cough  CV: no chest pain, no palpitations  GI: no nausea/vomiting, no  "diarrhea    Objective:     Exam:  /82 (BP Location: Left arm, Patient Position: Sitting, BP Cuff Size: Adult)   Pulse 74   Temp 36.6 °C (97.8 °F) (Temporal)   Resp 16   Ht 1.778 m (5' 10\")   Wt 101 kg (222 lb 9.6 oz)   SpO2 97%   BMI 31.94 kg/m²  Body mass index is 31.94 kg/m².    Constitutional: Alert, no distress, well-groomed.  Skin: Warm, dry, good turgor, no rashes in visible areas.  Eye: Equal, round and reactive, conjunctiva clear, lids normal.  ENMT: Lips without lesions, good dentition, moist mucous membranes.  Neck: Trachea midline, no masses, no thyromegaly.  Respiratory: Unlabored respiratory effort, no cough.  MSK: Normal gait, moves all extremities.  Neuro: Grossly non-focal.   Psych: Alert and oriented x3, normal affect and mood.        Assessment & Plan:     30 y.o. male with the following -     1. Testicular lump  This is a chronic, stable condition.  The patient has a history of a right testicular lump evaluated by ultrasonography in July 2020.  Results were positive for a possible epididymitis.  Appeared to be resolving according to the radiologist.  At this point the patient mentions that it is very difficult for him to feel the previously palpated lump.  In the differential also consider epididymal cyst as a possibility.  We will continue to monitor and should it persist or worsen or cause pain we will repeat ultrasound.  Patient is agreeable to plan.    2. Intractable migraine with aura without status migrainosus  This is a chronic, unchanged condition.  The patient continues to experience a lot of migraine headaches up to 4 times weekly.  He did a trial of amitriptyline without any success.  At this time I will refer to neurology for headache clinic.  In the meantime we will do a trial of Imitrex.  - REFERRAL TO NEUROLOGY  - sumatriptan (IMITREX) 100 MG tablet; Take 1 Tab by mouth Once PRN for Migraine for up to 1 dose.  Dispense: 10 Tab; Refill: 3      Return in about 6 months " (around 4/15/2021).    Please note that this dictation was created using voice recognition software. I have made every reasonable attempt to correct obvious errors, but I expect that there are errors of grammar and possibly content that I did not discover before finalizing the note.

## 2020-10-15 NOTE — ASSESSMENT & PLAN NOTE
This is a chronic condition.     Symptom onset: He has had migraines for many years  Current symptoms: Last migraine was a couple of days. He gets them 4x weekly.   Since onset symptoms are: Unchanged  Modifying factors: Mom has migraines too. We had discussed starting amitriptyline. He tried them but he began to experience worsening headaches.   Treatments tried: Advil and rest in a dark room   Associated symptoms: Denies any

## 2020-10-15 NOTE — ASSESSMENT & PLAN NOTE
This is a chronic problem  The patient noted a lump to his right testicle 3 months ago  It is laterally to the right testicle  It is asymptomatic   He is sexually active with his wife  Denies any penile discharge.     I ordered US and possibly epididymitis which appear to be resolving according to the radiologist.  The patient mentions that the previously palpated lump is very difficult to palpate now and continues to be asymptomatic.

## 2020-10-24 ENCOUNTER — HOSPITAL ENCOUNTER (OUTPATIENT)
Facility: MEDICAL CENTER | Age: 30
End: 2020-10-24
Attending: NURSE PRACTITIONER
Payer: COMMERCIAL

## 2020-10-24 PROCEDURE — U0003 INFECTIOUS AGENT DETECTION BY NUCLEIC ACID (DNA OR RNA); SEVERE ACUTE RESPIRATORY SYNDROME CORONAVIRUS 2 (SARS-COV-2) (CORONAVIRUS DISEASE [COVID-19]), AMPLIFIED PROBE TECHNIQUE, MAKING USE OF HIGH THROUGHPUT TECHNOLOGIES AS DESCRIBED BY CMS-2020-01-R: HCPCS

## 2020-10-25 LAB
COVID ORDER STATUS COVID19: NORMAL
SARS-COV-2 RNA RESP QL NAA+PROBE: DETECTED
SPECIMEN SOURCE: ABNORMAL

## 2021-02-19 ENCOUNTER — PATIENT MESSAGE (OUTPATIENT)
Dept: MEDICAL GROUP | Facility: PHYSICIAN GROUP | Age: 31
End: 2021-02-19

## 2021-02-19 DIAGNOSIS — G47.33 OSA (OBSTRUCTIVE SLEEP APNEA): ICD-10-CM

## 2021-02-23 ENCOUNTER — OFFICE VISIT (OUTPATIENT)
Dept: NEUROLOGY | Facility: MEDICAL CENTER | Age: 31
End: 2021-02-23
Attending: NURSE PRACTITIONER
Payer: COMMERCIAL

## 2021-02-23 VITALS
BODY MASS INDEX: 32 KG/M2 | TEMPERATURE: 98.2 F | HEART RATE: 86 BPM | RESPIRATION RATE: 14 BRPM | WEIGHT: 223.55 LBS | HEIGHT: 70 IN | SYSTOLIC BLOOD PRESSURE: 110 MMHG | OXYGEN SATURATION: 94 % | DIASTOLIC BLOOD PRESSURE: 70 MMHG

## 2021-02-23 DIAGNOSIS — G43.109 MIGRAINE WITH AURA AND WITHOUT STATUS MIGRAINOSUS, NOT INTRACTABLE: ICD-10-CM

## 2021-02-23 DIAGNOSIS — G44.40 MEDICATION OVERUSE HEADACHE: ICD-10-CM

## 2021-02-23 DIAGNOSIS — Z91.89 AT RISK FOR OBSTRUCTIVE SLEEP APNEA: ICD-10-CM

## 2021-02-23 PROCEDURE — 99204 OFFICE O/P NEW MOD 45 MIN: CPT | Performed by: NURSE PRACTITIONER

## 2021-02-23 PROCEDURE — 99211 OFF/OP EST MAY X REQ PHY/QHP: CPT | Performed by: NURSE PRACTITIONER

## 2021-02-23 RX ORDER — IBUPROFEN 800 MG/1
1000 TABLET ORAL EVERY 8 HOURS PRN
COMMUNITY
End: 2022-04-13

## 2021-02-23 RX ORDER — TOPIRAMATE 25 MG/1
50 TABLET ORAL
Qty: 180 TABLET | Refills: 3 | Status: SHIPPED | OUTPATIENT
Start: 2021-02-23 | End: 2021-10-18

## 2021-02-23 RX ORDER — ACETAMINOPHEN 500 MG
500-1000 TABLET ORAL EVERY 6 HOURS PRN
COMMUNITY
End: 2022-04-13

## 2021-02-23 RX ORDER — RIZATRIPTAN BENZOATE 10 MG/1
10 TABLET, ORALLY DISINTEGRATING ORAL
Qty: 9 TABLET | Refills: 11 | Status: SHIPPED | OUTPATIENT
Start: 2021-02-23 | End: 2021-10-18

## 2021-02-23 ASSESSMENT — ENCOUNTER SYMPTOMS
DEPRESSION: 0
FEVER: 0
VOMITING: 1
NECK PAIN: 1
BLURRED VISION: 1
NERVOUS/ANXIOUS: 1
BACK PAIN: 1
CONSTIPATION: 0
COUGH: 0
SEIZURES: 0
SENSORY CHANGE: 1
WEAKNESS: 1
TINGLING: 1
HEARTBURN: 0
DIARRHEA: 0
CHILLS: 0
PALPITATIONS: 0
BRUISES/BLEEDS EASILY: 0
NAUSEA: 1
HEADACHES: 1

## 2021-02-23 ASSESSMENT — PATIENT HEALTH QUESTIONNAIRE - PHQ9: CLINICAL INTERPRETATION OF PHQ2 SCORE: 0

## 2021-02-23 NOTE — PROGRESS NOTES
Subjective:    [facility] [place]  Fortino Silvestre is a 30 y.o. male who presents with chronic migraine.    He was referred by Dr. Joaquín Sanches       Age at Onset:  10   Triggers:  Denies triggers.  Alleviating factors:  Excedrin migraine, lay down in dark room. Vomiting helps.   Meds tried and result:  Sumatriptan doesn't help and makes his shoulders and neck feel very stiff.   He tried amitriptyline, it made his headaches worse.  Caffeine use:  2 per week.   OTC medications--frequency:  Takes OTC almost daily, sometimes tylenol, sometimes Advil.   Sleep apnea:  Wife told him he quits breathing in sleep.     How many days per month:  10-15/30 month  Missed days of school/work in past 6 months:  7 days.  Quality:  Right side, over right eye and straight up the head and around, pulsating, pain 7/10.  Can last up to 2 days.    Wakes up with headaches often.   N&V:  Both   Photo/phonophobia: none   Aura:  Black spots in visual fields.     PMH:  Migraine with aura, testicular lump  Social:  Denies tobacco use, 3 drinks per month, denies drug use. He is a  for an .   Family Hx:  Mother and sister have migraines, Negative for heart disease or stroke      Review of Systems   Constitutional: Negative for chills and fever.   HENT: Negative for hearing loss.    Eyes: Positive for blurred vision.   Respiratory: Negative for cough.    Cardiovascular: Negative for chest pain and palpitations.   Gastrointestinal: Positive for nausea and vomiting. Negative for constipation, diarrhea and heartburn.        N&V with headaches   Genitourinary: Negative for dysuria.   Musculoskeletal: Positive for back pain and neck pain.   Skin: Negative for rash.   Neurological: Positive for tingling, sensory change, weakness and headaches. Negative for seizures.        Feels like arms and legs are weak  Notices numbness and tingling of arms and legs.    Endo/Heme/Allergies: Does not bruise/bleed easily.  "  Psychiatric/Behavioral: Negative for depression. The patient is nervous/anxious.      Current Outpatient Medications on File Prior to Visit   Medication Sig Dispense Refill   • ibuprofen (MOTRIN) 800 MG Tab Take 1,000 mg by mouth every 8 hours as needed.     • acetaminophen (TYLENOL) 500 MG Tab Take 500-1,000 mg by mouth every 6 hours as needed.     • sumatriptan (IMITREX) 100 MG tablet Take 1 Tab by mouth Once PRN for Migraine for up to 1 dose. 10 Tab 3     No current facility-administered medications on file prior to visit.     History reviewed. No pertinent past medical history.     Objective:   Encounter Vitals  Standard Vitals  Vitals  Blood Pressure: 110/70  Temperature: 36.8 °C (98.2 °F)  Temp src: Temporal  Pulse: 86  Respiration: 14  Pulse Oximetry: 94 %  Height: 177.8 cm (5' 10\")  Weight: 101 kg (223 lb 8.7 oz)  Encounter Vitals  Temperature: 36.8 °C (98.2 °F)  Temp src: Temporal  Blood Pressure: 110/70  Pulse: 86  Respiration: 14  Pulse Oximetry: 94 %  Weight: 101 kg (223 lb 8.7 oz)  Height: 177.8 cm (5' 10\")  BMI (Calculated): 32.08      Physical Exam    Constitutional:  Alert, no apparent distress,  Psych:   mood and affect WNL  Neuro:  Oriented X 4, speech fluent, naming and memory intact  Muskuloskeletal:  Moves all extremities equally, strength 5/5 bilaterally, no drift  CN II: Visual fields are full to confrontation. Fundoscopic exam is normal with sharp discs and no vascular changes. Pupils are 4 mm and briskly reactive to light.   CN III, IV, VI  EOMs intact, no ptosis  CN V: Facial sensation is intact to pinprick in all 3 divisions bilaterally. Corneal responses are intact.  CN VII: Face is symmetric with normal eye closure and smile.  CN VII: Hearing is normal to rubbing fingers  CN IX, X: Palate elevates symmetrically. Phonation is normal.  CN XI: Head turning and shoulder shrug are intact  CN XII: Tongue is midline with normal movements and no atrophy.                           Sensation to " PP equal bilaterally                 No limb ataxia with finger to nose and heel to shin                 Ambulates with steady gait.                 Rhomberg negative                Biceps,brachioradialis, tricep, patellar and ankle reflex all 2+     Cardiovascular:    S1S2, no abnormal rhythm auscultated, no peripheral edema  Neck:                     No carotid bruits noted   Pulmonary:            Respirations easy, lungs clear to auscultation all fields.     Skin:                     No obvious rashes.         Assessment/Plan:   1. Migraine with aura and without status migrainosus, not intractable    Likely exacerbated by sleep apnea and overuse of OTC medications.       Had asthma in the past, would avoid beta blocker    Topiramate 25mg  ( 1 tablet)  by mouth at bedtime every night for 7 days, then 50mg (2 tablets) by mouth every night .  Discussed side effects.     Stop sumatriptan, start rizatriptan 10mg under the tongue once daily as needed,  No more than 9 tablets per month.     Start magnesium oxide 400mg by mouth every night, may take extra dose if needed for headache (over the counter), hold for diarrhea         Start Riboflavin (Vitamin B2) 400mg by mouth every night (over the counter),may turn urine bright yellow         Start COQ 10, take 300mg every night. (over the counter)          Attempt to go to bed and get up at the same time every night           Eat meals on regular basis            Stay hydrated.             Aerobic exercise 30 minutes daily             Avoid aged or smoked foods, avoid processed foods, red wine, aged cheese              Keep headache diary, include foods that you may have eaten.             Avoid overusing over the counter medications:  Do not take more than 500mg acetaminophen (tylenol), more than 4 times weekly, more frequent or larger doses are associated with medication overuse headache.      2. Medication overuse headache  Decrease use of over the counter medications to  no more than 4 times per week    3. At risk for obstructive sleep apnea  Sleep study ordered by Dr. Sanches.        Witnessed apneas       Wakes up gasping for air.       Daytime sleepiness       BMI 32.08      I spent 35  minutes caring for patient, my time includes reviewing the chart, obtaining current HPI, exam, discussion of disease process, ordering testing and medications and counseling.      I counseled patient on migraine triggers, lifestyle changes, medication overuse, supplements and medication side effects.      Follow up in 3 months.

## 2021-02-23 NOTE — PATIENT INSTRUCTIONS
Topiramate 25mg  ( 1 tablet)  by mouth at bedtime every night for 7 days, then 50mg (2 tablets) by mouth every night .  Discussed side effects.     Stop sumatriptan, start rizatriptan (Maxalt) 10mg under the tongue once daily as needed,  No more than 9 tablets per month.          Start magnesium oxide 400mg gel cap, by mouth every night, may take extra dose if needed for headache (over the counter), hold for diarrhea         Start Riboflavin (Vitamin B2) 400mg by mouth every night (over the counter),may turn urine bright yellow         Start COQ 10, take 300mg every night. (over the counter)          Attempt to go to bed and get up at the same time every night           Eat meals on regular basis            Stay hydrated.             Aerobic exercise 30 minutes daily             Avoid aged or smoked foods, avoid processed foods, red wine, aged cheese              Keep headache diary, include foods that you may have eaten.             Avoid overusing over the counter medications:  Do not take more than 500mg acetaminophen (tylenol), more than 4 times weekly, more frequent or larger doses are associated with medication overuse headache.              Migraine Headache  A migraine headache is a very strong throbbing pain on one side or both sides of your head. This type of headache can also cause other symptoms. It can last from 4 hours to 3 days. Talk with your doctor about what things may bring on (trigger) this condition.  What are the causes?  The exact cause of this condition is not known. This condition may be triggered or caused by:  · Drinking alcohol.  · Smoking.  · Taking medicines, such as:  ? Medicine used to treat chest pain (nitroglycerin).  ? Birth control pills.  ? Estrogen.  ? Some blood pressure medicines.  · Eating or drinking certain products.  · Doing physical activity.  Other things that may trigger a migraine headache include:  · Having a menstrual  period.  · Pregnancy.  · Hunger.  · Stress.  · Not getting enough sleep or getting too much sleep.  · Weather changes.  · Tiredness (fatigue).  What increases the risk?  · Being 25-55 years old.  · Being female.  · Having a family history of migraine headaches.  · Being .  · Having depression or anxiety.  · Being very overweight.  What are the signs or symptoms?  · A throbbing pain. This pain may:  ? Happen in any area of the head, such as on one side or both sides.  ? Make it hard to do daily activities.  ? Get worse with physical activity.  ? Get worse around bright lights or loud noises.  · Other symptoms may include:  ? Feeling sick to your stomach (nauseous).  ? Vomiting.  ? Dizziness.  ? Being sensitive to bright lights, loud noises, or smells.  · Before you get a migraine headache, you may get warning signs (an aura). An aura may include:  ? Seeing flashing lights or having blind spots.  ? Seeing bright spots, halos, or zigzag lines.  ? Having tunnel vision or blurred vision.  ? Having numbness or a tingling feeling.  ? Having trouble talking.  ? Having weak muscles.  · Some people have symptoms after a migraine headache (postdromal phase), such as:  ? Tiredness.  ? Trouble thinking (concentrating).  How is this treated?  · Taking medicines that:  ? Relieve pain.  ? Relieve the feeling of being sick to your stomach.  ? Prevent migraine headaches.  · Treatment may also include:  ? Having acupuncture.  ? Avoiding foods that bring on migraine headaches.  ? Learning ways to control your body functions (biofeedback).  ? Therapy to help you know and deal with negative thoughts (cognitive behavioral therapy).  Follow these instructions at home:  Medicines  · Take over-the-counter and prescription medicines only as told by your doctor.  · Ask your doctor if the medicine prescribed to you:  ? Requires you to avoid driving or using heavy machinery.  ? Can cause trouble pooping (constipation). You may need to  take these steps to prevent or treat trouble pooping:  § Drink enough fluid to keep your pee (urine) pale yellow.  § Take over-the-counter or prescription medicines.  § Eat foods that are high in fiber. These include beans, whole grains, and fresh fruits and vegetables.  § Limit foods that are high in fat and sugar. These include fried or sweet foods.  Lifestyle  · Do not drink alcohol.  · Do not use any products that contain nicotine or tobacco, such as cigarettes, e-cigarettes, and chewing tobacco. If you need help quitting, ask your doctor.  · Get at least 8 hours of sleep every night.  · Limit and deal with stress.  General instructions         · Keep a journal to find out what may bring on your migraine headaches. For example, write down:  ? What you eat and drink.  ? How much sleep you get.  ? Any change in what you eat or drink.  ? Any change in your medicines.  · If you have a migraine headache:  ? Avoid things that make your symptoms worse, such as bright lights.  ? It may help to lie down in a dark, quiet room.  ? Do not drive or use heavy machinery.  ? Ask your doctor what activities are safe for you.  · Keep all follow-up visits as told by your doctor. This is important.  Contact a doctor if:  · You get a migraine headache that is different or worse than others you have had.  · You have more than 15 headache days in one month.  Get help right away if:  · Your migraine headache gets very bad.  · Your migraine headache lasts longer than 72 hours.  · You have a fever.  · You have a stiff neck.  · You have trouble seeing.  · Your muscles feel weak or like you cannot control them.  · You start to lose your balance a lot.  · You start to have trouble walking.  · You pass out (faint).  · You have a seizure.  Summary  · A migraine headache is a very strong throbbing pain on one side or both sides of your head. These headaches can also cause other symptoms.  · This condition may be treated with medicines and  changes to your lifestyle.  · Keep a journal to find out what may bring on your migraine headaches.  · Contact a doctor if you get a migraine headache that is different or worse than others you have had.  · Contact your doctor if you have more than 15 headache days in a month.  This information is not intended to replace advice given to you by your health care provider. Make sure you discuss any questions you have with your health care provider.  Document Released: 09/26/2009 Document Revised: 04/10/2020 Document Reviewed: 01/30/2020  Elsevier Patient Education © 2020 [employer]    Analgesic Rebound Headache  An analgesic rebound headache, sometimes called a medication overuse headache, is a headache that comes after pain medicine (analgesic) taken to treat the original (primary) headache has worn off. Any type of primary headache can return as a rebound headache if a person regularly takes analgesics more than three times a week to treat it. The types of primary headaches that are commonly associated with rebound headaches include:  · Migraines.  · Headaches that arise from tense muscles in the head and neck area (tension headaches).  · Headaches that develop and happen again (recur) on one side of the head and around the eye (cluster headaches).  If rebound headaches continue, they become chronic daily headaches.  What are the causes?  This condition may be caused by frequent use of:  · Over-the-counter medicines such as aspirin, ibuprofen, and acetaminophen.  · Sinus relief medicines and other medicines that contain caffeine.  · Narcotic pain medicines such as codeine and oxycodone.  What are the signs or symptoms?  The symptoms of a rebound headache are the same as the symptoms of the original headache. Some of the symptoms of specific types of headaches include:  Migraine headache  · Pulsing or throbbing pain on one or both sides of the head.  · Severe pain that interferes with daily activities.  · Pain that  is worsened by physical activity.  · Nausea, vomiting, or both.  · Pain with exposure to bright light, loud noises, or strong smells.  · General sensitivity to bright light, loud noises, or strong smells.  · Visual changes.  · Numbness of one or both arms.  Tension headache  · Pressure around the head.  · Dull, aching head pain.  · Pain felt over the front and sides of the head.  · Tenderness in the muscles of the head, neck, and shoulders.  Cluster headache  · Severe pain that begins in or around one eye or temple.  · Redness and tearing in the eye on the same side as the pain.  · Droopy or swollen eyelid.  · One-sided head pain.  · Nausea.  · Runny nose.  · Sweaty, pale facial skin.  · Restlessness.  How is this diagnosed?  This condition is diagnosed by:  · Reviewing your medical history. This includes the nature of your primary headaches.  · Reviewing the types of pain medicines that you have been using to treat your headaches and how often you take them.  How is this treated?  This condition may be treated or managed by:  · Discontinuing frequent use of the analgesic medicine. Doing this may worsen your headaches at first, but the pain should eventually become more manageable, less frequent, and less severe.  · Seeing a headache specialist. He or she may be able to help you manage your headaches and help make sure there is not another cause of the headaches.  · Using methods of stress relief, such as acupuncture, counseling, biofeedback, and massage. Talk with your health care provider about which methods might be good for you.  Follow these instructions at home:  · Take over-the-counter and prescription medicines only as told by your health care provider.  · Stop the repeated use of pain medicine as told by your health care provider. Stopping can be difficult. Carefully follow instructions from your health care provider.  · Avoid triggers that are known to cause your primary headaches.  · Keep all follow-up  visits as told by your health care provider. This is important.  Contact a health care provider if:  · You continue to experience headaches after following treatments that your health care provider recommended.  Get help right away if:  · You develop new headache pain.  · You develop headache pain that is different than what you have experienced in the past.  · You develop numbness or tingling in your arms or legs.  · You develop changes in your speech or vision.  This information is not intended to replace advice given to you by your health care provider. Make sure you discuss any questions you have with your health care provider.  Document Released: 03/09/2005 Document Revised: 11/30/2018 Document Reviewed: 05/22/2017  Elsevier Patient Education © 2020 [employer]

## 2021-08-05 ENCOUNTER — HOSPITAL ENCOUNTER (OUTPATIENT)
Facility: MEDICAL CENTER | Age: 31
End: 2021-08-05
Attending: NURSE PRACTITIONER
Payer: COMMERCIAL

## 2021-08-05 PROCEDURE — U0005 INFEC AGEN DETEC AMPLI PROBE: HCPCS

## 2021-08-05 PROCEDURE — U0003 INFECTIOUS AGENT DETECTION BY NUCLEIC ACID (DNA OR RNA); SEVERE ACUTE RESPIRATORY SYNDROME CORONAVIRUS 2 (SARS-COV-2) (CORONAVIRUS DISEASE [COVID-19]), AMPLIFIED PROBE TECHNIQUE, MAKING USE OF HIGH THROUGHPUT TECHNOLOGIES AS DESCRIBED BY CMS-2020-01-R: HCPCS

## 2021-08-06 LAB
COVID ORDER STATUS COVID19: NORMAL
SARS-COV-2 RNA RESP QL NAA+PROBE: NOTDETECTED
SPECIMEN SOURCE: NORMAL

## 2021-10-18 ENCOUNTER — OFFICE VISIT (OUTPATIENT)
Dept: URGENT CARE | Facility: CLINIC | Age: 31
End: 2021-10-18
Payer: COMMERCIAL

## 2021-10-18 ENCOUNTER — APPOINTMENT (OUTPATIENT)
Dept: RADIOLOGY | Facility: IMAGING CENTER | Age: 31
End: 2021-10-18
Attending: NURSE PRACTITIONER
Payer: COMMERCIAL

## 2021-10-18 VITALS
BODY MASS INDEX: 32.93 KG/M2 | WEIGHT: 230 LBS | DIASTOLIC BLOOD PRESSURE: 72 MMHG | TEMPERATURE: 98.7 F | HEIGHT: 70 IN | HEART RATE: 86 BPM | OXYGEN SATURATION: 99 % | RESPIRATION RATE: 16 BRPM | SYSTOLIC BLOOD PRESSURE: 116 MMHG

## 2021-10-18 DIAGNOSIS — R07.9 LEFT-SIDED CHEST PAIN: ICD-10-CM

## 2021-10-18 DIAGNOSIS — M62.838 TRAPEZIUS MUSCLE SPASM: ICD-10-CM

## 2021-10-18 DIAGNOSIS — M25.512 ACUTE PAIN OF LEFT SHOULDER: ICD-10-CM

## 2021-10-18 DIAGNOSIS — Z82.49 FAMILY HISTORY OF MI (MYOCARDIAL INFARCTION): ICD-10-CM

## 2021-10-18 DIAGNOSIS — M54.2 NECK PAIN ON LEFT SIDE: ICD-10-CM

## 2021-10-18 DIAGNOSIS — J45.20 MILD INTERMITTENT ASTHMA WITHOUT COMPLICATION: ICD-10-CM

## 2021-10-18 DIAGNOSIS — R12 HEARTBURN: ICD-10-CM

## 2021-10-18 PROCEDURE — 71046 X-RAY EXAM CHEST 2 VIEWS: CPT | Mod: TC | Performed by: NURSE PRACTITIONER

## 2021-10-18 PROCEDURE — 93000 ELECTROCARDIOGRAM COMPLETE: CPT | Performed by: NURSE PRACTITIONER

## 2021-10-18 PROCEDURE — 99214 OFFICE O/P EST MOD 30 MIN: CPT | Performed by: NURSE PRACTITIONER

## 2021-10-18 RX ORDER — AMITRIPTYLINE HYDROCHLORIDE 10 MG/1
TABLET, FILM COATED ORAL
COMMUNITY
End: 2022-04-13

## 2021-10-18 RX ORDER — SUMATRIPTAN 100 MG/1
TABLET, FILM COATED ORAL
COMMUNITY
End: 2021-12-15

## 2021-10-18 RX ORDER — ALBUTEROL SULFATE 90 UG/1
1-2 AEROSOL, METERED RESPIRATORY (INHALATION) EVERY 6 HOURS PRN
Qty: 8.5 G | Refills: 0 | Status: SHIPPED | OUTPATIENT
Start: 2021-10-18 | End: 2021-12-15

## 2021-10-18 RX ORDER — METHOCARBAMOL 750 MG/1
750 TABLET, FILM COATED ORAL 4 TIMES DAILY PRN
Qty: 16 TABLET | Refills: 0 | Status: SHIPPED | OUTPATIENT
Start: 2021-10-18 | End: 2021-10-22

## 2021-10-18 ASSESSMENT — ENCOUNTER SYMPTOMS
WHEEZING: 0
FOCAL WEAKNESS: 0
HEMOPTYSIS: 0
ORTHOPNEA: 0
COUGH: 0
NERVOUS/ANXIOUS: 0
NECK PAIN: 1
PALPITATIONS: 1
SHORTNESS OF BREATH: 0
ABDOMINAL PAIN: 0
FEVER: 0
SENSORY CHANGE: 0
SPEECH CHANGE: 0
TINGLING: 0
VOMITING: 0
CHILLS: 0
SPUTUM PRODUCTION: 0
NAUSEA: 0
DIAPHORESIS: 0
DIZZINESS: 0
HEARTBURN: 1

## 2021-10-18 ASSESSMENT — LIFESTYLE VARIABLES: SUBSTANCE_ABUSE: 0

## 2021-10-18 NOTE — PROGRESS NOTES
Subjective     Fortino Silvestre is a 31 y.o. male who presents with Shoulder Pain (LT shoulder pain x 2 days )            Fortino comes in today with a 3 day history of left sided chest pain with pain radiating to left neck, trapezius and upper left arm regions.  He noted palpitations at onset of symptoms that have since resolved.  Associated symptoms include heartburn without nausea or vomiting.  He denies any known trauma or strain.  He has a history of similar episodic symptoms that have resolved with analgesics.  He had an unremarkable EKG in 2016 when experiencing right shoulder pain and chest pain.  He consumes small amounts of caffeine occasionally.  No recent alcohol consumption.  Denies any drug use or smoking.  No history of smoking in the past.  No personal history of known cardiovascular disease or MI.  He has a family history of cardiovascular disease and MI in his family.  Both his father, mother, and multiple uncles have survived MI in their 40s.  He report his sister had some sort of cardiac event, maybe an MI and sees cardiology regularly.  He also thinks his grandfather  at an early age due to MI.  Medical history significant for mild intermittent asthma, mostly triggered by exposure to allergic triggers like animal hair.  He was around a dog 1 day prior to onset of symptoms and noted chest tightness with shortness of breath.  Unfortunately he did not have any albuterol as he has ran out.  He reports the shortness of breath resolved without intervention.  Medical history also significant for migraines and medication overuse headaches.  He takes elevil and also imitrex prn.  He has tried tylenol and ibuprofen with no relief of his acute pain.        Review of Systems   Constitutional: Negative for chills, diaphoresis, fever and malaise/fatigue.   Respiratory: Negative for cough, hemoptysis, sputum production, shortness of breath and wheezing.    Cardiovascular: Positive for chest  "pain and palpitations. Negative for orthopnea and leg swelling.   Gastrointestinal: Positive for heartburn. Negative for abdominal pain, nausea and vomiting.   Musculoskeletal: Positive for neck pain.   Neurological: Negative for dizziness, tingling, sensory change, speech change and focal weakness.   Psychiatric/Behavioral: Negative for substance abuse. The patient is not nervous/anxious.      Medications, Allergies, and current problem list reviewed today in Epic         Objective     Blood Pressure 116/72   Pulse 86   Temperature 37.1 °C (98.7 °F) (Temporal)   Respiration 16   Height 1.778 m (5' 10\")   Weight 104 kg (230 lb)   Oxygen Saturation 99%   Body Mass Index 33.00 kg/m²      Physical Exam  Vitals reviewed.   Constitutional:       General: He is not in acute distress.     Appearance: Normal appearance. He is well-developed. He is not ill-appearing, toxic-appearing or diaphoretic.   HENT:      Nose: Nose normal.      Mouth/Throat:      Mouth: Mucous membranes are moist.      Pharynx: No oropharyngeal exudate or posterior oropharyngeal erythema.   Eyes:      General: No scleral icterus.        Right eye: No discharge.         Left eye: No discharge.      Conjunctiva/sclera: Conjunctivae normal.      Pupils: Pupils are equal, round, and reactive to light.   Neck:      Thyroid: No thyromegaly.      Vascular: No carotid bruit or JVD.      Trachea: No tracheal deviation.   Cardiovascular:      Rate and Rhythm: Normal rate and regular rhythm.      Heart sounds: Normal heart sounds. No murmur heard.   No friction rub. No gallop.       Comments: Mild generalized upper left chest wall TTP with no crepitus, bruising, swelling, erythema, rash or lesion.   Pulmonary:      Effort: Pulmonary effort is normal. No respiratory distress.      Breath sounds: No stridor. Wheezing present. No rhonchi or rales.      Comments: Light wheezing in left upper field.    Chest:      Chest wall: No tenderness.   Musculoskeletal: "      Cervical back: Neck supple. No tenderness.      Comments: Generalized left trapezius and left lateral neck TTP with diffuse palpable muscle spasms.  Left shoulder ROM intact with no crepitus.  Generalized left shoulder TTP.  Distal left arm NV, sensation and strength intact.  Cap refill < 2 seconds.  No bruising, swelling, erythema, rash or lesion.   Lymphadenopathy:      Cervical: No cervical adenopathy.   Skin:     General: Skin is warm and dry.      Capillary Refill: Capillary refill takes less than 2 seconds.      Coloration: Skin is not pale.      Findings: No erythema.   Neurological:      General: No focal deficit present.      Mental Status: He is alert and oriented to person, place, and time.      Cranial Nerves: No cranial nerve deficit.      Motor: No weakness.      Gait: Gait normal.   Psychiatric:         Mood and Affect: Mood normal.         Behavior: Behavior normal.         Thought Content: Thought content normal.             In clinic EKG:   Normal sinus rhythm.  Compared to prior EKG 10/20/16.  Rate 69    QRSD 93    QTc 404  Axis       P 52       QRS 74       T 40     DX-CHEST-2 VIEWS  Order: [id number]  Status:  Final result   Visible to patient:  No (scheduled for 10/19/2021  8:12 AM) Next appt:  None Dx:  Left-sided chest pain   0 Result Notes  Details    Reading Physician Reading Date Result Priority   Serge Moura M.D.  [phone] 10/18/2021 Urgent Care   Narrative & Impression     10/18/2021 9:51 AM     HISTORY/REASON FOR EXAM:  Chest Pain; atraumatic left sided chest pain.  Left-sided chest pain and shoulder pain for 2 days     TECHNIQUE/EXAM DESCRIPTION AND NUMBER OF VIEWS:  Two views of the chest.     COMPARISON:  Two-view chest 10/16/2019     FINDINGS:  The soft tissues and bony structures are unremarkable.     The heart and mediastinal structures are within normal limits.     Pulmonary vascularity is normal.     The lung fields are clear.     There is no effusion or  pneumothorax.     IMPRESSION:     No acute cardiopulmonary disease evident.        Exam Ended: 10/18/21  9:57 AM Last Resulted: 10/18/21 10:10 AM                      Assessment & Plan        1. Left-sided chest pain  EKG - Clinic Performed    DX-CHEST-2 VIEWS    REFERRAL TO CARDIOLOGY   2. Trapezius muscle spasm  methocarbamol (ROBAXIN) 750 MG Tab   3. Neck pain on left side     4. Acute pain of left shoulder  methocarbamol (ROBAXIN) 750 MG Tab   5. Family history of MI (myocardial infarction)  EKG - Clinic Performed    REFERRAL TO CARDIOLOGY   6. Mild intermittent asthma without complication  albuterol 108 (90 Base) MCG/ACT Aero Soln inhalation aerosol   7. Heartburn       Discussed exam findings with Fortino.  Musculoskeletal versus cardiac etiology differential reviewed.    Recommended further evaluation and care in the ED.  Fortino declinesthis recommendation at this time but will reconsider and go to the ED today if conservative therapy fails to improve his symptoms.  Robaxin as prescribed.  Sedation precautions discussed.  Avoid use while driving, operating machinery, or performing safety sensitive tasks.    OCT analgesics, warm compress, and massage prn pain.  Albuterol as prescribed.  Follow up with cardiology as referred to go to ED sooner for any persistent or worsening symptoms.    911 precautions reviewed.  He verbalized understanding of and agreed with plan of care.

## 2021-12-15 ENCOUNTER — TELEMEDICINE (OUTPATIENT)
Dept: TELEHEALTH | Facility: TELEMEDICINE | Age: 31
End: 2021-12-15
Payer: COMMERCIAL

## 2021-12-15 DIAGNOSIS — G43.109 MIGRAINE WITH AURA AND WITHOUT STATUS MIGRAINOSUS, NOT INTRACTABLE: ICD-10-CM

## 2021-12-15 DIAGNOSIS — J45.909 ALLERGIC ASTHMA WITHOUT ACUTE EXACERBATION OR STATUS ASTHMATICUS: ICD-10-CM

## 2021-12-15 PROCEDURE — 99214 OFFICE O/P EST MOD 30 MIN: CPT | Performed by: NURSE PRACTITIONER

## 2021-12-15 RX ORDER — ALBUTEROL SULFATE 90 UG/1
1-2 AEROSOL, METERED RESPIRATORY (INHALATION) EVERY 4 HOURS PRN
Qty: 1 EACH | Refills: 0 | Status: SHIPPED | OUTPATIENT
Start: 2021-12-15 | End: 2023-04-24 | Stop reason: SDUPTHER

## 2021-12-15 RX ORDER — RIZATRIPTAN BENZOATE 10 MG/1
10 TABLET, ORALLY DISINTEGRATING ORAL
Qty: 10 TABLET | Refills: 0 | Status: SHIPPED | OUTPATIENT
Start: 2021-12-15 | End: 2022-04-13

## 2021-12-15 RX ORDER — TOPIRAMATE 25 MG/1
50 TABLET ORAL
COMMUNITY
End: 2022-04-13

## 2021-12-15 ASSESSMENT — ENCOUNTER SYMPTOMS
WHEEZING: 0
FEVER: 0
ORTHOPNEA: 0
CHILLS: 0
SHORTNESS OF BREATH: 0
SPUTUM PRODUCTION: 0
COUGH: 0
HEMOPTYSIS: 0
PALPITATIONS: 0

## 2021-12-15 NOTE — PROGRESS NOTES
Subjective     Fortino Silvestre is a 31 y.o. male who presents with No chief complaint on file.            Fortino presents for a virtual visit in [place].  Identification was verified.  Patient was informed that encounter would be conducted over [employer], a secure, encrypted network and consent was obtained.  He has a long history of migraines and sees a neurologist regularly.  He has a migraine action plan in place including Topiramate 50 mg at bedtime nightly and then use of Rizatriptan 10 mg one tab prn for acute migraine symptoms with a max of 9 doses per month.  He is taking the Topiramate as previously prescribed but is out of the Rizatriptan.  He denies any new or worsening migraine symptoms but has noted a more frequent migraine trigger.  He reports he is allergic to cats and dogs.  He has always been sensitive to their dander or fur but more so lately with wheezing when around them for any period of time.  He has tried an OTC inhaler with no relief.  He is not taking an antihistamine.  He notes that after pet exposure, he has migraine headaches more frequently.  He is not having any acute symptoms presently.        Review of Systems   Constitutional: Negative for chills, fever and malaise/fatigue.   Respiratory: Negative for cough, hemoptysis, sputum production, shortness of breath and wheezing.    Cardiovascular: Negative for chest pain, palpitations, orthopnea and leg swelling.   Skin: Negative for itching and rash.     Medications, Allergies, and current problem list reviewed today in Epic         Objective     There were no vitals taken for this visit.     Physical Exam  Constitutional:       General: He is not in acute distress.     Appearance: Normal appearance. He is not ill-appearing, toxic-appearing or diaphoretic.   HENT:      Nose: Nose normal.      Mouth/Throat:      Mouth: Mucous membranes are moist.      Comments: Lips pink; phonation normal.    Eyes:      Conjunctiva/sclera: Conjunctivae  normal.   Pulmonary:      Effort: Pulmonary effort is normal.   Skin:     Coloration: Skin is not jaundiced.   Neurological:      Mental Status: He is alert and oriented to person, place, and time.      Cranial Nerves: No cranial nerve deficit.      Motor: No weakness.   Psychiatric:         Mood and Affect: Mood normal.         Behavior: Behavior normal.                             Assessment & Plan        1. Migraine with aura and without status migrainosus, not intractable  - rizatriptan (MAXALT-MLT) 10 MG disintegrating tablet; Take 1 Tablet by mouth one time as needed for Migraine for up to 1 dose. Maximum 1 dose in 24 hours.  Maximum 9 doses in 1 month.  Dispense: 10 Tablet; Refill: 0    2. Allergic asthma without acute exacerbation or status asthmaticus  - albuterol 108 (90 Base) MCG/ACT Aero Soln inhalation aerosol; Inhale 1-2 Puffs every four hours as needed for Shortness of Breath.  Dispense: 1 Each; Refill: 0    Discussed exam findings with Fortino.   Reviewed his current migraine action plan as established by neurology with Fortino.  Rizatriptan as prescribed.  Do not use any other triptans with rizatriptan concurrently (within 24 hours).    Continue topiramate as previously prescribed.  Follow up with neurology for routine migraine therapy maintenance.  ED precautions reviewed.  In regards to the reaction to pet dander/fur, strong ED/911 precautions reviewed.  Take a daily long acting antihistamine such as zyrtec.  Albuterol as prescribed for acute shortness of breath or wheezing.  Discontinue use of OTC inhaler.    Follow up with a PCP to discuss allergy and asthma maintenance.    He verbalized understanding of and agreed with plan of care.

## 2021-12-16 ENCOUNTER — TELEPHONE (OUTPATIENT)
Dept: NEUROLOGY | Facility: MEDICAL CENTER | Age: 31
End: 2021-12-16

## 2022-01-24 ENCOUNTER — HOSPITAL ENCOUNTER (OUTPATIENT)
Facility: MEDICAL CENTER | Age: 32
End: 2022-01-24
Attending: NURSE PRACTITIONER
Payer: COMMERCIAL

## 2022-01-24 PROCEDURE — U0003 INFECTIOUS AGENT DETECTION BY NUCLEIC ACID (DNA OR RNA); SEVERE ACUTE RESPIRATORY SYNDROME CORONAVIRUS 2 (SARS-COV-2) (CORONAVIRUS DISEASE [COVID-19]), AMPLIFIED PROBE TECHNIQUE, MAKING USE OF HIGH THROUGHPUT TECHNOLOGIES AS DESCRIBED BY CMS-2020-01-R: HCPCS

## 2022-01-24 PROCEDURE — U0005 INFEC AGEN DETEC AMPLI PROBE: HCPCS

## 2022-01-25 LAB — COVID ORDER STATUS COVID19: NORMAL

## 2022-01-26 LAB
SARS-COV-2 RNA RESP QL NAA+PROBE: DETECTED
SPECIMEN SOURCE: ABNORMAL

## 2022-04-13 ENCOUNTER — OFFICE VISIT (OUTPATIENT)
Dept: URGENT CARE | Facility: PHYSICIAN GROUP | Age: 32
End: 2022-04-13
Payer: COMMERCIAL

## 2022-04-13 VITALS
OXYGEN SATURATION: 94 % | TEMPERATURE: 98.7 F | WEIGHT: 228 LBS | RESPIRATION RATE: 16 BRPM | HEART RATE: 92 BPM | BODY MASS INDEX: 32.64 KG/M2 | DIASTOLIC BLOOD PRESSURE: 82 MMHG | HEIGHT: 70 IN | SYSTOLIC BLOOD PRESSURE: 118 MMHG

## 2022-04-13 DIAGNOSIS — J45.21 MILD INTERMITTENT ASTHMA WITH EXACERBATION: ICD-10-CM

## 2022-04-13 DIAGNOSIS — R05.9 COUGH: ICD-10-CM

## 2022-04-13 PROCEDURE — 99214 OFFICE O/P EST MOD 30 MIN: CPT | Performed by: FAMILY MEDICINE

## 2022-04-13 RX ORDER — PREDNISONE 20 MG/1
40 TABLET ORAL DAILY
Qty: 10 TABLET | Refills: 0 | Status: SHIPPED | OUTPATIENT
Start: 2022-04-13 | End: 2022-04-18

## 2022-04-13 RX ORDER — DOXYCYCLINE HYCLATE 100 MG
100 TABLET ORAL 2 TIMES DAILY
Qty: 10 TABLET | Refills: 0 | Status: SHIPPED | OUTPATIENT
Start: 2022-04-13 | End: 2022-04-18

## 2022-04-13 RX ORDER — BENZONATATE 200 MG/1
200 CAPSULE ORAL 3 TIMES DAILY PRN
Qty: 30 CAPSULE | Refills: 0 | Status: SHIPPED | OUTPATIENT
Start: 2022-04-13 | End: 2022-12-28

## 2022-04-13 ASSESSMENT — ENCOUNTER SYMPTOMS
VOMITING: 0
DIARRHEA: 0
EYE DISCHARGE: 0
WEIGHT LOSS: 0
EYE REDNESS: 0
MYALGIAS: 0

## 2022-04-13 NOTE — PROGRESS NOTES
"Subjective     Fortino Silvestre is a 31 y.o. male who presents with Cough (Sore throat,x3 days)            3 days productive cough without blood in sputum. ST. No fever. +wheeze. +PMH asthma. Used albuterol inhaler x4 yesterday. Tolerating fluids with normal urine output. No nasal congestion. No other aggravating or alleviating factors.        Review of Systems   Constitutional: Negative for malaise/fatigue and weight loss.   Eyes: Negative for discharge and redness.   Gastrointestinal: Negative for diarrhea and vomiting.   Musculoskeletal: Negative for joint pain and myalgias.   Skin: Negative for itching and rash.              Objective     /82 (BP Location: Right arm, Patient Position: Sitting, BP Cuff Size: Adult)   Pulse 92   Temp 37.1 °C (98.7 °F) (Temporal)   Resp 16   Ht 1.778 m (5' 10\")   Wt 103 kg (228 lb)   SpO2 94%   BMI 32.71 kg/m²       Physical Exam  Constitutional:       General: He is not in acute distress.     Appearance: He is well-developed.   HENT:      Head: Normocephalic and atraumatic.      Right Ear: Tympanic membrane normal.      Left Ear: Tympanic membrane normal.      Nose: Nose normal.      Mouth/Throat:      Pharynx: Posterior oropharyngeal erythema present.   Eyes:      Conjunctiva/sclera: Conjunctivae normal.   Cardiovascular:      Rate and Rhythm: Normal rate and regular rhythm.      Heart sounds: Normal heart sounds. No murmur heard.  Pulmonary:      Effort: Pulmonary effort is normal.      Breath sounds: Wheezing present.   Skin:     General: Skin is warm and dry.      Findings: No rash.   Neurological:      Mental Status: He is alert and oriented to person, place, and time.                             Assessment & Plan        1. Cough  benzonatate (TESSALON) 200 MG capsule    doxycycline (VIBRAMYCIN) 100 MG Tab   2. Mild intermittent asthma with exacerbation  predniSONE (DELTASONE) 20 MG Tab     Differential diagnosis, natural history, supportive care, and " indications for immediate follow-up discussed at length.     Contingent antibiotic prescription given to patient to fill upon meeting criteria of guidelines discussed.

## 2022-09-26 ENCOUNTER — OFFICE VISIT (OUTPATIENT)
Dept: URGENT CARE | Facility: CLINIC | Age: 32
End: 2022-09-26
Payer: COMMERCIAL

## 2022-09-26 VITALS
HEIGHT: 70 IN | DIASTOLIC BLOOD PRESSURE: 84 MMHG | BODY MASS INDEX: 32.78 KG/M2 | OXYGEN SATURATION: 93 % | TEMPERATURE: 97.4 F | HEART RATE: 95 BPM | WEIGHT: 229 LBS | SYSTOLIC BLOOD PRESSURE: 126 MMHG | RESPIRATION RATE: 18 BRPM

## 2022-09-26 DIAGNOSIS — G43.109 MIGRAINE WITH AURA AND WITHOUT STATUS MIGRAINOSUS, NOT INTRACTABLE: ICD-10-CM

## 2022-09-26 PROCEDURE — 99213 OFFICE O/P EST LOW 20 MIN: CPT | Performed by: NURSE PRACTITIONER

## 2022-09-26 RX ORDER — KETOROLAC TROMETHAMINE 30 MG/ML
30 INJECTION, SOLUTION INTRAMUSCULAR; INTRAVENOUS ONCE
Status: COMPLETED | OUTPATIENT
Start: 2022-09-26 | End: 2022-09-26

## 2022-09-26 RX ORDER — IBUPROFEN 800 MG/1
800 TABLET ORAL EVERY 8 HOURS PRN
Qty: 30 TABLET | Refills: 0 | Status: SHIPPED | OUTPATIENT
Start: 2022-09-26 | End: 2023-07-24

## 2022-09-26 RX ADMIN — KETOROLAC TROMETHAMINE 30 MG: 30 INJECTION, SOLUTION INTRAMUSCULAR; INTRAVENOUS at 15:04

## 2022-09-26 ASSESSMENT — ENCOUNTER SYMPTOMS: HEADACHES: 1

## 2022-09-26 NOTE — PROGRESS NOTES
"Subjective     JadBenji Silvestre is a 32 y.o. male who presents with Headache (X2day, )            Headache  Headache pattern: pt reports new onset of HA that started 2 days ago. he has nausea, floaters. describes it as a constant sharp pain and pressure from right eye to the back of his head.  Providers seen:  Neurologist  Quality:  Stabbing and pulsating  Laterality:  Right side only  Location:  Temples/sides  Aggravating factors:  None  Abortive treatments: pt states he has had HAs since he was a teenager, neurology gave him triptans but he has since run out. he has only been using tylenol recently for pain.    Review of Systems   Neurological:  Positive for headaches.   All other systems reviewed and are negative.         History reviewed. No pertinent past medical history. History reviewed. No pertinent surgical history.   Social History     Socioeconomic History    Marital status:      Spouse name: Not on file    Number of children: Not on file    Years of education: Not on file    Highest education level: Not on file   Occupational History    Not on file   Tobacco Use    Smoking status: Never    Smokeless tobacco: Never   Vaping Use    Vaping Use: Never used   Substance and Sexual Activity    Alcohol use: Not Currently     Alcohol/week: 2.4 oz     Types: 4 Cans of beer per week     Comment: occasionally    Drug use: Never    Sexual activity: Not on file   Other Topics Concern    Not on file   Social History Narrative    Not on file     Social Determinants of Health     Financial Resource Strain: Not on file   Food Insecurity: Not on file   Transportation Needs: Not on file   Physical Activity: Not on file   Stress: Not on file   Social Connections: Not on file   Intimate Partner Violence: Not on file   Housing Stability: Not on file       Objective     /84   Pulse 95   Temp 36.3 °C (97.4 °F) (Temporal)   Resp 18   Ht 1.778 m (5' 10\")   Wt 104 kg (229 lb)   SpO2 93%   BMI 32.86 kg/m² "      Physical Exam  Vitals and nursing note reviewed.   Constitutional:       Appearance: Normal appearance.   HENT:      Head: Normocephalic and atraumatic.      Nose: Nose normal.      Mouth/Throat:      Mouth: Mucous membranes are moist.      Pharynx: Oropharynx is clear.   Eyes:      Extraocular Movements: Extraocular movements intact.      Conjunctiva/sclera: Conjunctivae normal.      Pupils: Pupils are equal, round, and reactive to light.   Cardiovascular:      Rate and Rhythm: Normal rate and regular rhythm.   Pulmonary:      Effort: Pulmonary effort is normal.   Musculoskeletal:         General: Normal range of motion.      Cervical back: Normal range of motion and neck supple.   Skin:     General: Skin is warm and dry.      Capillary Refill: Capillary refill takes less than 2 seconds.   Neurological:      General: No focal deficit present.      Mental Status: He is alert and oriented to person, place, and time. Mental status is at baseline.   Psychiatric:         Mood and Affect: Mood normal.         Thought Content: Thought content normal.         Judgment: Judgment normal.                           Assessment & Plan        1. Migraine with aura and without status migrainosus, not intractable  - ketorolac (TORADOL) injection 30 mg  - ibuprofen (MOTRIN) 800 MG Tab; Take 1 Tablet by mouth every 8 hours as needed for Headache.  Dispense: 30 Tablet; Refill: 0       Has previously had a toradol injection, tolerated it well  He denies any help with the triptan  Would like to try ibuprofen   Advised him he needs to see his neurologist for a better plan to control his HAs  He understands and agrees  Supportive care, differential diagnoses, and indications for immediate follow-up discussed with patient.    Pathogenesis of diagnosis discussed including typical length and natural progression.    Instructed to return to  or nearest emergency department if symptoms fail to improve, for any change in condition,  further concerns, or new concerning symptoms.  Patient states understanding of the plan of care and discharge instructions.

## 2022-10-19 ENCOUNTER — OFFICE VISIT (OUTPATIENT)
Dept: URGENT CARE | Facility: CLINIC | Age: 32
End: 2022-10-19
Payer: COMMERCIAL

## 2022-10-19 VITALS
OXYGEN SATURATION: 94 % | WEIGHT: 228 LBS | RESPIRATION RATE: 16 BRPM | TEMPERATURE: 98 F | SYSTOLIC BLOOD PRESSURE: 126 MMHG | HEART RATE: 85 BPM | BODY MASS INDEX: 32.64 KG/M2 | HEIGHT: 70 IN | DIASTOLIC BLOOD PRESSURE: 84 MMHG

## 2022-10-19 DIAGNOSIS — J45.991 COUGH VARIANT ASTHMA: ICD-10-CM

## 2022-10-19 DIAGNOSIS — J22 LOWER RESPIRATORY TRACT INFECTION: ICD-10-CM

## 2022-10-19 DIAGNOSIS — J45.21 MILD INTERMITTENT ASTHMA WITH EXACERBATION: ICD-10-CM

## 2022-10-19 LAB
EXTERNAL QUALITY CONTROL: NORMAL
INT CON NEG: NORMAL
INT CON POS: NORMAL
SARS-COV+SARS-COV-2 AG RESP QL IA.RAPID: NEGATIVE

## 2022-10-19 PROCEDURE — 99214 OFFICE O/P EST MOD 30 MIN: CPT | Mod: 25 | Performed by: PHYSICIAN ASSISTANT

## 2022-10-19 PROCEDURE — 87426 SARSCOV CORONAVIRUS AG IA: CPT | Performed by: PHYSICIAN ASSISTANT

## 2022-10-19 PROCEDURE — 94640 AIRWAY INHALATION TREATMENT: CPT | Performed by: PHYSICIAN ASSISTANT

## 2022-10-19 RX ORDER — IPRATROPIUM BROMIDE AND ALBUTEROL SULFATE 2.5; .5 MG/3ML; MG/3ML
3 SOLUTION RESPIRATORY (INHALATION) ONCE
Status: COMPLETED | OUTPATIENT
Start: 2022-10-19 | End: 2022-10-19

## 2022-10-19 RX ORDER — PREDNISONE 50 MG/1
50 TABLET ORAL DAILY
Qty: 5 TABLET | Refills: 0 | Status: SHIPPED | OUTPATIENT
Start: 2022-10-19 | End: 2022-10-24

## 2022-10-19 RX ORDER — FLUTICASONE PROPIONATE AND SALMETEROL 100; 50 UG/1; UG/1
1 POWDER RESPIRATORY (INHALATION) EVERY 12 HOURS
Qty: 1 EACH | Refills: 2 | Status: SHIPPED | OUTPATIENT
Start: 2022-10-19

## 2022-10-19 RX ORDER — AZITHROMYCIN 250 MG/1
TABLET, FILM COATED ORAL
Qty: 6 TABLET | Refills: 0 | Status: SHIPPED | OUTPATIENT
Start: 2022-10-19 | End: 2022-10-25

## 2022-10-19 RX ADMIN — IPRATROPIUM BROMIDE AND ALBUTEROL SULFATE 3 ML: 2.5; .5 SOLUTION RESPIRATORY (INHALATION) at 17:49

## 2022-10-19 ASSESSMENT — ENCOUNTER SYMPTOMS
SHORTNESS OF BREATH: 1
WHEEZING: 1

## 2022-10-20 NOTE — PROGRESS NOTES
"Subjective:   Fortino Silvestre is a 32 y.o. male who presents for Shortness of Breath (X3 days, feels like his inhaler is not helping, painful to take deep breath ) and Wheezing  This is a 32-year-old male who reports longstanding history of asthma.  He presents with 3-day history of increased coughing associated with increased shortness of breath.  He denies fevers or chills.  No myalgias.  He feels some chest tightness.  He has been using his albuterol inhaler without significant improvement.  He normally uses has albuterol on an as-needed basis.  He denies productive cough.  He has required steroids in the past.  Shortness of Breath  Associated symptoms include wheezing.   Wheezing   Associated symptoms include shortness of breath.       Review of Systems   Respiratory:  Positive for shortness of breath and wheezing.      Medications:  albuterol Aers  benzonatate  ibuprofen Tabs  RIZATRIPTAN BENZOATE PO    Allergies:             Patient has no known allergies.    Surgical History:       No past surgical history on file.    Past Social Hx:  Fortino Silvestre  reports that he has never smoked. He has never used smokeless tobacco. He reports current alcohol use of about 2.4 oz per week. He reports that he does not use drugs.     Past Family Hx:   Fortino Silvestre family history is not on file.       Problem list, medications, and allergies reviewed by myself today in Epic.     Objective:     /84   Pulse 85   Temp 36.7 °C (98 °F) (Temporal)   Resp 16   Ht 1.778 m (5' 10\")   Wt 103 kg (228 lb)   SpO2 94%   BMI 32.71 kg/m²     Physical Exam  Constitutional:       General: He is not in acute distress.     Appearance: He is well-developed. He is not diaphoretic.   HENT:      Head: Normocephalic.      Right Ear: Tympanic membrane, ear canal and external ear normal.      Left Ear: Tympanic membrane, ear canal and external ear normal.      Nose: Mucosal edema and rhinorrhea present.      " Mouth/Throat:      Pharynx: Posterior oropharyngeal erythema present.   Eyes:      General:         Right eye: No discharge.         Left eye: No discharge.      Conjunctiva/sclera: Conjunctivae normal.      Pupils: Pupils are equal, round, and reactive to light.   Cardiovascular:      Rate and Rhythm: Normal rate and regular rhythm.   Pulmonary:      Effort: Pulmonary effort is normal. No accessory muscle usage or respiratory distress.      Breath sounds: No stridor. Wheezing present. No rales.      Comments: Expiratory wheezing heard throughout all lung fields.  No significant rhonchi or rales.  Abdominal:      General: Bowel sounds are normal. There is no distension.      Palpations: Abdomen is soft.      Tenderness: There is no abdominal tenderness. There is no guarding or rebound.   Musculoskeletal:         General: Normal range of motion.      Cervical back: Normal range of motion.   Lymphadenopathy:      Cervical: No cervical adenopathy.   Skin:     General: Skin is warm and dry.   Neurological:      Mental Status: He is alert and oriented to person, place, and time.     DuoNeb administered in office with dramatic improvement in auscultation.    Rapid COVID-negative  Assessment/Plan:     Diagnosis and Associated Orders:     1. Mild intermittent asthma with exacerbation  - ipratropium-albuterol (DUONEB) nebulizer solution  - predniSONE (DELTASONE) 50 MG Tab; Take 1 Tablet by mouth every day for 5 days.  Dispense: 5 Tablet; Refill: 0  - fluticasone-salmeterol (ADVAIR) 100-50 MCG/ACT AEROSOL POWDER, BREATH ACTIVATED; Inhale 1 Puff every 12 hours.  Dispense: 1 Each; Refill: 2    2. Cough variant asthma  - predniSONE (DELTASONE) 50 MG Tab; Take 1 Tablet by mouth every day for 5 days.  Dispense: 5 Tablet; Refill: 0  - fluticasone-salmeterol (ADVAIR) 100-50 MCG/ACT AEROSOL POWDER, BREATH ACTIVATED; Inhale 1 Puff every 12 hours.  Dispense: 1 Each; Refill: 2  - POCT SARS-COV Antigen ALLISON Manual Result    3. Lower  respiratory tract infection  - azithromycin (ZITHROMAX Z-BROOKS) 250 MG Tab; Take 2 tabs (500 mg) on day one, then 1 tab (250 mg) on days 2-5  Dispense: 6 Tablet; Refill: 0  - POCT SARS-COV Antigen ALLISON Manual Result      Comments/MDM:    Rapid COVID-negative.  Patient denies fever chills or myalgias.  Vital signs stable and reassuring.  Lung auscultation consistent with bilateral expiratory wheezing.  DuoNeb administered with improvement in normal pulse oximetry.  He is not tachycardic or tachypneic.  Given progressive nature of symptoms will treat with prednisone and Advair, continue to use albuterol 4 hours.  Will cover for lower respiratory tract infection.  Strict ED precautions discussed.    I personally reviewed prior external notes and test results pertinent to today's visit.  Red flags discussed as well as indications to present to the Emergency Department.  Supportive care, natural history, differential diagnoses, and indications for immediate follow-up discussed.  Patient expresses understanding and agrees to plan.  Patient denies any other questions or concerns.    Follow-up with the primary care physician for recheck, reevaluation, and consideration of further management.      Please note that this dictation was created using voice recognition software. I have made a reasonable attempt to correct obvious errors, but I expect that there are errors of grammar and possibly content that I did not discover before finalizing the note.    This note was electronically signed by Chel Deluca PA-C

## 2022-12-28 ENCOUNTER — OFFICE VISIT (OUTPATIENT)
Dept: MEDICAL GROUP | Facility: PHYSICIAN GROUP | Age: 32
End: 2022-12-28
Payer: COMMERCIAL

## 2022-12-28 VITALS
DIASTOLIC BLOOD PRESSURE: 70 MMHG | OXYGEN SATURATION: 92 % | WEIGHT: 230 LBS | HEIGHT: 70 IN | HEART RATE: 95 BPM | BODY MASS INDEX: 32.93 KG/M2 | TEMPERATURE: 97.8 F | SYSTOLIC BLOOD PRESSURE: 112 MMHG

## 2022-12-28 DIAGNOSIS — G47.33 OSA (OBSTRUCTIVE SLEEP APNEA): ICD-10-CM

## 2022-12-28 DIAGNOSIS — J45.40 MODERATE PERSISTENT ASTHMA WITHOUT COMPLICATION: ICD-10-CM

## 2022-12-28 DIAGNOSIS — Z00.00 WELL ADULT EXAM: ICD-10-CM

## 2022-12-28 PROCEDURE — 99214 OFFICE O/P EST MOD 30 MIN: CPT | Performed by: FAMILY MEDICINE

## 2022-12-28 ASSESSMENT — PATIENT HEALTH QUESTIONNAIRE - PHQ9: CLINICAL INTERPRETATION OF PHQ2 SCORE: 0

## 2022-12-28 NOTE — PROGRESS NOTES
"Subjective:     Chief Complaint   Patient presents with    Requesting Labs     Routine blood work       HPI:   Fortino presents today to discuss the following.    SHAZIA (obstructive sleep apnea)  New issue  Woke up in the middle of his sleep gasping for air  This has happened multiple times  Also snoring     Moderate persistent asthma without complication  Chronic issue  On advair    Past Medical History:   Diagnosis Date    Asthma        Current Outpatient Medications Ordered in Epic   Medication Sig Dispense Refill    fluticasone-salmeterol (ADVAIR) 100-50 MCG/ACT AEROSOL POWDER, BREATH ACTIVATED Inhale 1 Puff every 12 hours. 1 Each 2    ibuprofen (MOTRIN) 800 MG Tab Take 1 Tablet by mouth every 8 hours as needed for Headache. 30 Tablet 0    albuterol 108 (90 Base) MCG/ACT Aero Soln inhalation aerosol Inhale 1-2 Puffs every four hours as needed for Shortness of Breath. 1 Each 0     No current [facility]-ordered facility-administered medications on file.       Allergies:  Patient has no known allergies.    Health Maintenance: Completed    ROS:  Gen: no fevers/chills, no changes in weight  Eyes: no changes in vision  Pulm: no sob, no cough  CV: no chest pain, no palpitations  GI: no nausea/vomiting, no diarrhea      Objective:     Exam:  /70 (BP Location: Left arm, Patient Position: Sitting, BP Cuff Size: Adult)   Pulse 95   Temp 36.6 °C (97.8 °F) (Temporal)   Ht 1.778 m (5' 10\")   Wt 104 kg (230 lb)   SpO2 92%   BMI 33.00 kg/m²  Body mass index is 33 kg/m².    Gen: Alert and oriented, No apparent distress.  HENT: TMs are clear. Oropharynx is w/o erythema or exudates. Neck is supple without cervical lymphadenopathy. No JVD.   Eyes: PERRLA  Lungs: Normal effort, CTA bilaterally, no wheezes, rhonchi, or rales  CV: Regular rate and rhythm. No murmurs, rubs, or gallops.  Abdomen: Soft, nontender, nondistended. Normal bowel sounds. Liver and spleen are not palpable  Ext: No clubbing, cyanosis, edema.  Skin: no rash, " lesions or ulcers  Neuro: Moves all extremities.  Psych: AAOx3      Assessment & Plan:     32 y.o. male with the following -     1. SHAZIA (obstructive sleep apnea)  Chronic, worsening condition.  The patient has noted worsening snoring and also waking up in the middle the night gasping for air.  This has been witnessed by his wife as well.  My concern is obstructive sleep apnea.  I will refer to sleep medicine.  - Referral to Pulmonary and Sleep Medicine    2. Moderate persistent asthma without complication  Chronic, stable condition.  Continue with Advair and albuterol.    3. Well adult exam  - CBC WITHOUT DIFFERENTIAL; Future  - Comp Metabolic Panel; Future  - HEMOGLOBIN A1C; Future  - Lipid Profile; Future      Return in about 3 months (around 3/28/2023).          Please note that this dictation was created using voice recognition software. I have made every reasonable attempt to correct obvious errors, but I expect that there are errors of grammar and possibly content that I did not discover before finalizing the note.

## 2022-12-28 NOTE — ASSESSMENT & PLAN NOTE
New issue  Woke up in the middle of his sleep gasping for air  This has happened multiple times  Also snoring

## 2023-04-24 ENCOUNTER — OFFICE VISIT (OUTPATIENT)
Dept: MEDICAL GROUP | Facility: PHYSICIAN GROUP | Age: 33
End: 2023-04-24
Payer: COMMERCIAL

## 2023-04-24 VITALS
HEIGHT: 70 IN | RESPIRATION RATE: 16 BRPM | HEART RATE: 83 BPM | OXYGEN SATURATION: 94 % | BODY MASS INDEX: 32.78 KG/M2 | SYSTOLIC BLOOD PRESSURE: 98 MMHG | TEMPERATURE: 97.7 F | WEIGHT: 229 LBS | DIASTOLIC BLOOD PRESSURE: 70 MMHG

## 2023-04-24 DIAGNOSIS — J45.909 ALLERGIC ASTHMA WITHOUT ACUTE EXACERBATION OR STATUS ASTHMATICUS: ICD-10-CM

## 2023-04-24 DIAGNOSIS — G47.33 OSA (OBSTRUCTIVE SLEEP APNEA): ICD-10-CM

## 2023-04-24 DIAGNOSIS — J45.40 MODERATE PERSISTENT ASTHMA WITHOUT COMPLICATION: ICD-10-CM

## 2023-04-24 PROCEDURE — 99214 OFFICE O/P EST MOD 30 MIN: CPT | Performed by: FAMILY MEDICINE

## 2023-04-24 RX ORDER — ALBUTEROL SULFATE 90 UG/1
1-2 AEROSOL, METERED RESPIRATORY (INHALATION) EVERY 4 HOURS PRN
Qty: 1 EACH | Refills: 3 | Status: SHIPPED | OUTPATIENT
Start: 2023-04-24 | End: 2023-07-24

## 2023-04-24 ASSESSMENT — PATIENT HEALTH QUESTIONNAIRE - PHQ9: CLINICAL INTERPRETATION OF PHQ2 SCORE: 0

## 2023-04-24 NOTE — PROGRESS NOTES
"Subjective:     Chief Complaint   Patient presents with    Follow-Up     Needs lab orders, medication refills       HPI:   Fortino presents today to discuss the following.    Moderate persistent asthma without complication  Chronic issue  Using albuterol   And not using advair PRN     SHAZIA (obstructive sleep apnea)  Chronic issue  Now CPAP     Past Medical History:   Diagnosis Date    Asthma        Current Outpatient Medications Ordered in Epic   Medication Sig Dispense Refill    albuterol 108 (90 Base) MCG/ACT Aero Soln inhalation aerosol Inhale 1-2 Puffs every four hours as needed for Shortness of Breath. 1 Each 3    fluticasone-salmeterol (ADVAIR) 100-50 MCG/ACT AEROSOL POWDER, BREATH ACTIVATED Inhale 1 Puff every 12 hours. 1 Each 2    ibuprofen (MOTRIN) 800 MG Tab Take 1 Tablet by mouth every 8 hours as needed for Headache. 30 Tablet 0     No current Epic-ordered facility-administered medications on file.       Allergies:  Patient has no known allergies.    Health Maintenance: Completed    ROS:  Gen: no fevers/chills, no changes in weight  Eyes: no changes in vision  Pulm: no sob, no cough  CV: no chest pain, no palpitations  GI: no nausea/vomiting, no diarrhea      Objective:     Exam:  BP 98/70 (BP Location: Left arm, Patient Position: Sitting, BP Cuff Size: Adult)   Pulse 83   Temp 36.5 °C (97.7 °F) (Temporal)   Resp 16   Ht 1.778 m (5' 10\")   Wt 104 kg (229 lb)   SpO2 94%   BMI 32.86 kg/m²  Body mass index is 32.86 kg/m².    Gen: Alert and oriented, No apparent distress.  Eyes: PERRLA  Lungs: Normal effort, CTA bilaterally, no wheezes, rhonchi, or rales  CV: Regular rate and rhythm. No murmurs, rubs, or gallops.  Ext: No clubbing, cyanosis, edema.  Skin: no rash, lesions or ulcers  Neuro: Moves all extremities.  Psych: AAOx3          Assessment & Plan:     32 y.o. male with the following -     1. Allergic asthma without acute exacerbation or status asthmaticus  2. Moderate persistent asthma without " complication  Chronic, stable condition.  Patient presents requesting albuterol refill.  He is only using Advair as needed.  I went over his asthma regimen and recommend that he actually starts using the albuterol consistently to prevent asthma from getting worse and only use albuterol as needed.  He verbalized understanding.  - albuterol 108 (90 Base) MCG/ACT Aero Soln inhalation aerosol; Inhale 1-2 Puffs every four hours as needed for Shortness of Breath.  Dispense: 1 Each; Refill: 3    3. SHAZIA (obstructive sleep apnea)  Chronic, stable condition.  Continue with CPAP      Return in about 3 months (around 7/24/2023) for PHYSICAL.          Please note that this dictation was created using voice recognition software. I have made every reasonable attempt to correct obvious errors, but I expect that there are errors of grammar and possibly content that I did not discover before finalizing the note.

## 2023-05-09 ENCOUNTER — HOSPITAL ENCOUNTER (OUTPATIENT)
Dept: LAB | Facility: MEDICAL CENTER | Age: 33
End: 2023-05-09
Attending: FAMILY MEDICINE
Payer: COMMERCIAL

## 2023-05-09 DIAGNOSIS — Z00.00 WELL ADULT EXAM: ICD-10-CM

## 2023-05-09 LAB
ALBUMIN SERPL BCP-MCNC: 4.4 G/DL (ref 3.2–4.9)
ALBUMIN/GLOB SERPL: 1.5 G/DL
ALP SERPL-CCNC: 93 U/L (ref 30–99)
ALT SERPL-CCNC: 41 U/L (ref 2–50)
ANION GAP SERPL CALC-SCNC: 12 MMOL/L (ref 7–16)
AST SERPL-CCNC: 19 U/L (ref 12–45)
BILIRUB SERPL-MCNC: 0.8 MG/DL (ref 0.1–1.5)
BUN SERPL-MCNC: 21 MG/DL (ref 8–22)
CALCIUM ALBUM COR SERPL-MCNC: 9 MG/DL (ref 8.5–10.5)
CALCIUM SERPL-MCNC: 9.3 MG/DL (ref 8.5–10.5)
CHLORIDE SERPL-SCNC: 102 MMOL/L (ref 96–112)
CHOLEST SERPL-MCNC: 132 MG/DL (ref 100–199)
CO2 SERPL-SCNC: 25 MMOL/L (ref 20–33)
CREAT SERPL-MCNC: 1.11 MG/DL (ref 0.5–1.4)
ERYTHROCYTE [DISTWIDTH] IN BLOOD BY AUTOMATED COUNT: 59.3 FL (ref 35.9–50)
EST. AVERAGE GLUCOSE BLD GHB EST-MCNC: 100 MG/DL
GFR SERPLBLD CREATININE-BSD FMLA CKD-EPI: 89 ML/MIN/1.73 M 2
GLOBULIN SER CALC-MCNC: 3 G/DL (ref 1.9–3.5)
GLUCOSE SERPL-MCNC: 94 MG/DL (ref 65–99)
HBA1C MFR BLD: 5.1 % (ref 4–5.6)
HCT VFR BLD AUTO: 46.6 % (ref 42–52)
HDLC SERPL-MCNC: 31 MG/DL
HGB BLD-MCNC: 16 G/DL (ref 14–18)
LDLC SERPL CALC-MCNC: 75 MG/DL
MCH RBC QN AUTO: 29.7 PG (ref 27–33)
MCHC RBC AUTO-ENTMCNC: 34.3 G/DL (ref 33.7–35.3)
MCV RBC AUTO: 86.6 FL (ref 81.4–97.8)
PLATELET # BLD AUTO: 302 K/UL (ref 164–446)
PMV BLD AUTO: 9.7 FL (ref 9–12.9)
POTASSIUM SERPL-SCNC: 4.2 MMOL/L (ref 3.6–5.5)
PROT SERPL-MCNC: 7.4 G/DL (ref 6–8.2)
RBC # BLD AUTO: 5.38 M/UL (ref 4.7–6.1)
SODIUM SERPL-SCNC: 139 MMOL/L (ref 135–145)
TRIGL SERPL-MCNC: 132 MG/DL (ref 0–149)
WBC # BLD AUTO: 11.8 K/UL (ref 4.8–10.8)

## 2023-05-09 PROCEDURE — 80061 LIPID PANEL: CPT

## 2023-05-09 PROCEDURE — 85027 COMPLETE CBC AUTOMATED: CPT

## 2023-05-09 PROCEDURE — 83036 HEMOGLOBIN GLYCOSYLATED A1C: CPT

## 2023-05-09 PROCEDURE — 80053 COMPREHEN METABOLIC PANEL: CPT

## 2023-05-09 PROCEDURE — 36415 COLL VENOUS BLD VENIPUNCTURE: CPT

## 2023-07-24 ENCOUNTER — OFFICE VISIT (OUTPATIENT)
Dept: MEDICAL GROUP | Facility: PHYSICIAN GROUP | Age: 33
End: 2023-07-24
Payer: COMMERCIAL

## 2023-07-24 VITALS
BODY MASS INDEX: 32.93 KG/M2 | SYSTOLIC BLOOD PRESSURE: 102 MMHG | WEIGHT: 230 LBS | TEMPERATURE: 97.4 F | DIASTOLIC BLOOD PRESSURE: 68 MMHG | HEART RATE: 78 BPM | OXYGEN SATURATION: 94 % | HEIGHT: 70 IN

## 2023-07-24 DIAGNOSIS — Z00.00 WELL ADULT EXAM: ICD-10-CM

## 2023-07-24 DIAGNOSIS — J45.909 ALLERGIC ASTHMA WITHOUT ACUTE EXACERBATION OR STATUS ASTHMATICUS: ICD-10-CM

## 2023-07-24 DIAGNOSIS — D72.829 LEUKOCYTOSIS, UNSPECIFIED TYPE: ICD-10-CM

## 2023-07-24 PROCEDURE — 3074F SYST BP LT 130 MM HG: CPT | Performed by: FAMILY MEDICINE

## 2023-07-24 PROCEDURE — 99395 PREV VISIT EST AGE 18-39: CPT | Performed by: FAMILY MEDICINE

## 2023-07-24 PROCEDURE — 3078F DIAST BP <80 MM HG: CPT | Performed by: FAMILY MEDICINE

## 2023-07-24 RX ORDER — ALBUTEROL SULFATE 90 UG/1
1-2 AEROSOL, METERED RESPIRATORY (INHALATION) EVERY 4 HOURS PRN
Qty: 1 EACH | Refills: 3 | Status: SHIPPED | OUTPATIENT
Start: 2023-07-24

## 2023-07-24 ASSESSMENT — FIBROSIS 4 INDEX: FIB4 SCORE: 0.33

## 2023-07-24 NOTE — PROGRESS NOTES
Subjective:     CC:   Chief Complaint   Patient presents with    Follow-Up    Lab Results       HPI:   Fortino Silvestre is a 32 y.o. male who presents for an annual exam. He is feeling well and has no complaints.    Health Maintenance  Diet: eating better   Exercise: not as active   Substance Abuse: none   Safe in relationship.   Seat belts, bike helmet, gun safety discussed.  Sun protection used.    Cancer screening  Colorectal Cancer Screening: not due      Infectious disease screening/Immunizations  --Immunizations:    He  has a past medical history of Asthma.    He has no past medical history of Cancer (HCC).  He  has no past surgical history on file.  Family History   Problem Relation Age of Onset    Heart Disease Neg Hx     Stroke Neg Hx      Social History     Tobacco Use    Smoking status: Never    Smokeless tobacco: Never   Vaping Use    Vaping Use: Never used   Substance Use Topics    Alcohol use: Yes     Alcohol/week: 2.4 oz     Types: 4 Cans of beer per week     Comment: occasionally    Drug use: Never       Patient Active Problem List    Diagnosis Date Noted    SHAZIA (obstructive sleep apnea) 12/28/2022    Moderate persistent asthma without complication 12/28/2022    Migraine with aura and without status migrainosus, not intractable 02/23/2021    Medication overuse headache 02/23/2021    At risk for obstructive sleep apnea 02/23/2021    Migraine with aura 07/14/2020    Testicular lump 07/14/2020       Current Outpatient Medications   Medication Sig Dispense Refill    fluticasone-salmeterol (ADVAIR) 100-50 MCG/ACT AEROSOL POWDER, BREATH ACTIVATED Inhale 1 Puff every 12 hours. 1 Each 2     No current facility-administered medications for this visit.    (including changes today)  Allergies: Patient has no known allergies.    Review of Systems   Constitutional: Negative for fever, chills and malaise/fatigue.   HENT: Negative for congestion.    Eyes: Negative for pain.   Respiratory: Negative for cough  "and shortness of breath.    Cardiovascular: Negative for leg swelling.   Gastrointestinal: Negative for nausea, vomiting, abdominal pain and diarrhea.   Genitourinary: Negative for dysuria and hematuria.   Skin: Negative for rash.   Neurological: Negative for dizziness, focal weakness and headaches.   Endo/Heme/Allergies: Does not bruise/bleed easily.   Psychiatric/Behavioral: Negative for depression.  The patient is not nervous/anxious.      Objective:     /68 (BP Location: Left arm, Patient Position: Sitting, BP Cuff Size: Adult)   Pulse 78   Temp 36.3 °C (97.4 °F)   Ht 1.778 m (5' 10\")   Wt 104 kg (230 lb)   SpO2 94%   BMI 33.00 kg/m²   Body mass index is 33 kg/m².  Wt Readings from Last 4 Encounters:   07/24/23 104 kg (230 lb)   04/24/23 104 kg (229 lb)   12/28/22 104 kg (230 lb)   10/19/22 103 kg (228 lb)       Physical Exam:  Constitutional: Well-developed and well-nourished. Not diaphoretic. No distress.   Skin: Skin is warm and dry. No rash noted.  Head: Atraumatic without lesions.  Eyes: Conjunctivae and extraocular motions are normal. Pupils are equal, round, and reactive to light. No scleral icterus.   Ears:  External ears unremarkable. Tympanic membranes clear and intact.  Nose: Nares patent. Septum midline. Turbinates without erythema nor edema. No discharge.   Mouth/Throat: Dentition is normal. Tongue normal. Oropharynx is clear and moist. Posterior pharynx without erythema or exudates.  Neck: Supple, trachea midline. Normal range of motion. No thyromegaly present. No lymphadenopathy--cervical or supraclavicular.  Cardiovascular: Regular rate and rhythm, S1 and S2 without murmur, rubs, or gallops.    Lungs: Effort normal. Clear to auscultation throughout. No adventitious sounds. No CVA tenderness.  Abdomen: Soft, non tender, and without distention. Active bowel sounds in all four quadrants. No rebound, guarding, masses or HSM.  Extremities: No cyanosis, clubbing, erythema, nor edema. Distal " pulses intact and symmetric.   Musculoskeletal: All major joints AROM full in all directions without pain.  Neurological: Alert and oriented x 3.   Psychiatric:  Behavior, mood, and affect are appropriate.      Assessment and Plan:     1. Well adult exam        2. Leukocytosis, unspecified type  CBC WITH DIFFERENTIAL          HCM: completed.  Labs per orders.  Counseling about diet, supplements, exercise, skin care and safe sex.    Follow-up: No follow-ups on file.

## 2023-09-27 ENCOUNTER — DOCUMENTATION (OUTPATIENT)
Dept: HEALTH INFORMATION MANAGEMENT | Facility: OTHER | Age: 33
End: 2023-09-27
Payer: COMMERCIAL

## 2024-03-16 ENCOUNTER — HOSPITAL ENCOUNTER (OUTPATIENT)
Dept: LAB | Facility: MEDICAL CENTER | Age: 34
End: 2024-03-16
Attending: FAMILY MEDICINE
Payer: COMMERCIAL

## 2024-03-16 LAB
ALBUMIN SERPL BCP-MCNC: 4.5 G/DL (ref 3.2–4.9)
ALBUMIN/GLOB SERPL: 1.3 G/DL
ALP SERPL-CCNC: 100 U/L (ref 30–99)
ALT SERPL-CCNC: 44 U/L (ref 2–50)
ANION GAP SERPL CALC-SCNC: 10 MMOL/L (ref 7–16)
AST SERPL-CCNC: 23 U/L (ref 12–45)
BASOPHILS # BLD AUTO: 0.5 % (ref 0–1.8)
BASOPHILS # BLD: 0.05 K/UL (ref 0–0.12)
BILIRUB SERPL-MCNC: 0.6 MG/DL (ref 0.1–1.5)
BUN SERPL-MCNC: 17 MG/DL (ref 8–22)
CALCIUM ALBUM COR SERPL-MCNC: 8.5 MG/DL (ref 8.5–10.5)
CALCIUM SERPL-MCNC: 8.9 MG/DL (ref 8.4–10.2)
CHLORIDE SERPL-SCNC: 105 MMOL/L (ref 96–112)
CHOLEST SERPL-MCNC: 125 MG/DL (ref 100–199)
CO2 SERPL-SCNC: 27 MMOL/L (ref 20–33)
CREAT SERPL-MCNC: 0.95 MG/DL (ref 0.5–1.4)
EOSINOPHIL # BLD AUTO: 0.32 K/UL (ref 0–0.51)
EOSINOPHIL NFR BLD: 3.5 % (ref 0–6.9)
ERYTHROCYTE [DISTWIDTH] IN BLOOD BY AUTOMATED COUNT: 51.7 FL (ref 35.9–50)
GFR SERPLBLD CREATININE-BSD FMLA CKD-EPI: 107 ML/MIN/1.73 M 2
GLOBULIN SER CALC-MCNC: 3.5 G/DL (ref 1.9–3.5)
GLUCOSE SERPL-MCNC: 95 MG/DL (ref 65–99)
HCT VFR BLD AUTO: 45.7 % (ref 42–52)
HDLC SERPL-MCNC: 29 MG/DL
HGB BLD-MCNC: 16.5 G/DL (ref 14–18)
IMM GRANULOCYTES # BLD AUTO: 0.08 K/UL (ref 0–0.11)
IMM GRANULOCYTES NFR BLD AUTO: 0.9 % (ref 0–0.9)
LDLC SERPL CALC-MCNC: 73 MG/DL
LYMPHOCYTES # BLD AUTO: 3.5 K/UL (ref 1–4.8)
LYMPHOCYTES NFR BLD: 37.9 % (ref 22–41)
MCH RBC QN AUTO: 30.2 PG (ref 27–33)
MCHC RBC AUTO-ENTMCNC: 36.1 G/DL (ref 32.3–36.5)
MCV RBC AUTO: 83.7 FL (ref 81.4–97.8)
MONOCYTES # BLD AUTO: 0.53 K/UL (ref 0–0.85)
MONOCYTES NFR BLD AUTO: 5.7 % (ref 0–13.4)
NEUTROPHILS # BLD AUTO: 4.75 K/UL (ref 1.82–7.42)
NEUTROPHILS NFR BLD: 51.5 % (ref 44–72)
NRBC # BLD AUTO: 0 K/UL
NRBC BLD-RTO: 0 /100 WBC (ref 0–0.2)
PLATELET # BLD AUTO: 304 K/UL (ref 164–446)
PMV BLD AUTO: 9.2 FL (ref 9–12.9)
POTASSIUM SERPL-SCNC: 4.3 MMOL/L (ref 3.6–5.5)
PROT SERPL-MCNC: 8 G/DL (ref 6–8.2)
RBC # BLD AUTO: 5.46 M/UL (ref 4.7–6.1)
SODIUM SERPL-SCNC: 142 MMOL/L (ref 135–145)
TRIGL SERPL-MCNC: 114 MG/DL (ref 0–149)
WBC # BLD AUTO: 9.2 K/UL (ref 4.8–10.8)

## 2024-03-16 PROCEDURE — 36415 COLL VENOUS BLD VENIPUNCTURE: CPT

## 2024-03-16 PROCEDURE — 80053 COMPREHEN METABOLIC PANEL: CPT

## 2024-03-16 PROCEDURE — 85025 COMPLETE CBC W/AUTO DIFF WBC: CPT

## 2024-03-16 PROCEDURE — 80061 LIPID PANEL: CPT

## 2024-09-15 ENCOUNTER — APPOINTMENT (OUTPATIENT)
Dept: RADIOLOGY | Facility: IMAGING CENTER | Age: 34
End: 2024-09-15
Attending: NURSE PRACTITIONER
Payer: COMMERCIAL

## 2024-09-15 ENCOUNTER — OFFICE VISIT (OUTPATIENT)
Dept: URGENT CARE | Facility: PHYSICIAN GROUP | Age: 34
End: 2024-09-15
Payer: COMMERCIAL

## 2024-09-15 VITALS
BODY MASS INDEX: 33.6 KG/M2 | OXYGEN SATURATION: 95 % | RESPIRATION RATE: 16 BRPM | DIASTOLIC BLOOD PRESSURE: 60 MMHG | TEMPERATURE: 97.8 F | HEART RATE: 89 BPM | HEIGHT: 71 IN | SYSTOLIC BLOOD PRESSURE: 114 MMHG | WEIGHT: 240 LBS

## 2024-09-15 DIAGNOSIS — R06.2 BILATERAL WHEEZING: ICD-10-CM

## 2024-09-15 DIAGNOSIS — G43.801 OTHER MIGRAINE WITH STATUS MIGRAINOSUS, NOT INTRACTABLE: ICD-10-CM

## 2024-09-15 PROCEDURE — 71046 X-RAY EXAM CHEST 2 VIEWS: CPT | Mod: TC | Performed by: NURSE PRACTITIONER

## 2024-09-15 PROCEDURE — 3074F SYST BP LT 130 MM HG: CPT | Performed by: NURSE PRACTITIONER

## 2024-09-15 PROCEDURE — 3078F DIAST BP <80 MM HG: CPT | Performed by: NURSE PRACTITIONER

## 2024-09-15 PROCEDURE — 99214 OFFICE O/P EST MOD 30 MIN: CPT | Performed by: NURSE PRACTITIONER

## 2024-09-15 RX ORDER — SUMATRIPTAN 6 MG/.5ML
6 INJECTION, SOLUTION SUBCUTANEOUS ONCE
Status: COMPLETED | OUTPATIENT
Start: 2024-09-15 | End: 2024-09-15

## 2024-09-15 RX ADMIN — SUMATRIPTAN 6 MG: 6 INJECTION, SOLUTION SUBCUTANEOUS at 13:33

## 2024-09-15 ASSESSMENT — VISUAL ACUITY: OU: 1

## 2024-09-15 ASSESSMENT — ENCOUNTER SYMPTOMS: HEADACHES: 1

## 2024-09-15 ASSESSMENT — FIBROSIS 4 INDEX: FIB4 SCORE: 0.41

## 2024-09-15 NOTE — PROGRESS NOTES
Subjective:   Fortino Silvestre  is a 34 y.o. male who presents for Migraine (Onset yesterday, hx of migraines )       Headache  Providers seen:  Neurosurgeon and primary care provider  Age of onset (years):  10  Lifetime total:  20+  Longest time without a headache:  Weeks  Number of ER visits for headache:  12+  Time of day symptoms are worse:  No specific time of day  Days of the week symptoms are worse:  No specific day of the week  Season symptoms are worse:  No particular season  Duration:  2 days  Headaches last more than three days?: No    Aggravating factors:  None  He is out of his migraine medication at this time.  He is only been able to take Tylenol this morning with minimal effect.  He has an appointment scheduled with his primary care in the next couple of weeks.  States he has been wheezing for about a month.  He has a history of asthma with albuterol MDI and nebulizer solution available.  Minimal relief. Pattern is the same as it always presents from his right eye to the back of his head. Right side only, constant and stabbing.   Headache red flags  -Recent head trauma: no  -History of exposure to CO, alcohol, drugs: no  -Headaches that awake patient from sleep: no  -Associated change in vision: no  -On anticoagulant therapy: no  -Lack of coordination: no  -Associated neurologic abnormalities (altered mental status, seizure): no  -New onset of headache over the age of 50 years old: no  -Rapid onset of headache with strenuous exercise: no  -Rapid increase in headache frequency: no  -Immunocompromised (Current diagnosis of cancer, HIV): no   Review of Systems   Neurological:  Positive for headaches.     SEE HPI    CURRENT MEDICATIONS:  albuterol Aers  Albuterol Sulfate (sensor) Aepb  amitriptyline Tabs  benzonatate Caps  fluticasone-salmeterol Aepb  Nurtec Tbdp  rizatriptan  sumatriptan  triazolam Tabs  Allergies:   No Known Allergies  Current Problems: Fortino Silvestre does not have  "any pertinent problems on file.  Past Surgical Hx:  No past surgical history on file.   Past Social Hx:  reports that he has never smoked. He has never used smokeless tobacco. He reports current alcohol use of about 2.4 oz of alcohol per week. He reports that he does not use drugs.    Objective:   /60 (BP Location: Right arm, Patient Position: Sitting, BP Cuff Size: Adult)   Pulse 89   Temp 36.6 °C (97.8 °F) (Temporal)   Resp 16   Ht 1.803 m (5' 11\")   Wt 109 kg (240 lb)   SpO2 95%   BMI 33.47 kg/m²   Physical Exam  Vitals and nursing note reviewed.   Constitutional:       General: He is not in acute distress.     Appearance: Normal appearance. He is normal weight. He is not ill-appearing (apprears mildly uncomfortable).   HENT:      Head: Normocephalic and atraumatic.      Right Ear: Tympanic membrane and external ear normal.      Left Ear: Tympanic membrane and external ear normal.      Nose: Nose normal.      Mouth/Throat:      Mouth: Mucous membranes are moist.      Pharynx: Oropharynx is clear.   Eyes:      General: Lids are normal. Vision grossly intact.      Extraocular Movements: Extraocular movements intact.      Conjunctiva/sclera:      Right eye: Right conjunctiva is injected.      Left eye: Left conjunctiva is injected.      Pupils: Pupils are equal, round, and reactive to light.      Comments: Slightly injected sclera bilaterally   Cardiovascular:      Rate and Rhythm: Normal rate and regular rhythm.      Pulses: Normal pulses.      Heart sounds: Normal heart sounds.   Pulmonary:      Effort: Pulmonary effort is normal. No tachypnea or bradypnea.      Breath sounds: Decreased air movement present. Examination of the right-upper field reveals wheezing and rhonchi. Examination of the left-upper field reveals wheezing and rhonchi. Examination of the right-middle field reveals rhonchi. Examination of the left-middle field reveals rhonchi. Examination of the right-lower field reveals rhonchi. " Examination of the left-lower field reveals rhonchi. Decreased breath sounds, wheezing and rhonchi present.   Musculoskeletal:         General: Normal range of motion.      Cervical back: Normal range of motion and neck supple.   Skin:     General: Skin is warm and dry.      Findings: No rash.   Neurological:      General: No focal deficit present.      Mental Status: He is alert and oriented to person, place, and time.   Psychiatric:         Mood and Affect: Mood normal.         Behavior: Behavior normal.     DX-CHEST-2 VIEWS    Result Date: 9/15/2024  9/15/2024 1:24 PM HISTORY/REASON FOR EXAM:  Cough Short of breath TECHNIQUE/EXAM DESCRIPTION AND NUMBER OF VIEWS: Two views of the chest. COMPARISON:  None. FINDINGS: No pulmonary infiltrates or consolidations are noted. No pleural effusions, no pneumothorax are appreciated. Normal cardiopericardial silhouette.     1. No active cardiopulmonary abnormalities are identified.     Assessment/Plan:   1. Other migraine without status migrainosus, intractable  - SUMAtriptan Succinate (Imitrex) injection 6 mg    2. Bilateral wheezing  - DX-CHEST-2 VIEWS    34 old male presents with migraine.  Vital signs stable, afebrile.  He is in no acute distress does appear mildly ill.   Sumatriptan IM ordered.  Chest x-ray ordered due to rhonchi throughout, negative result.  Slightly improved with sumatriptan.  No refills needed for his nebulizer solution or his MDI.  Advised to discuss management of his asthma and migraines, he needs to follow-up with his neurologist and his primary care.  Reviewed concerning signs and symptoms, red flags, and return to clinic precautions.  I personally reviewed prior external notes and prior test results pertinent to today's visit.   Discussed management options, risks and benefits, and alternatives to treatment plan agreed upon.   Red flags discussed and indications to immediately call 911 or present to the Emergency Department.   Supportive care,  differential diagnoses, and indications for immediate follow-up discussed with patient.    Patient expresses understanding and agrees to plan. Patient denies any other questions or concerns.     Please note that this dictation was created using voice recognition software. I have made every reasonable attempt to correct obvious errors, but I expect that there may be errors of grammar and possibly content that I did not discover before finalizing the note.   This note was electronically signed by SRIKANTH Quesada

## 2024-09-15 NOTE — PATIENT INSTRUCTIONS
Asthma, Adult    Asthma is a long-term (chronic) condition that causes recurrent episodes in which the lower airways in the lungs become tight and narrow. The narrowing is caused by inflammation and tightening of the smooth muscle around the lower airways.  Asthma episodes, also called asthma attacks or asthma flares, may cause coughing, making high-pitched whistling sounds when you breathe, most often when you breathe out (wheezing), shortness of breath, and chest pain. The airways may produce extra mucus caused by the inflammation and irritation. During an attack, it can be difficult to breathe. Asthma attacks can range from minor to life-threatening.  Asthma cannot be cured, but medicines and lifestyle changes can help control it and treat acute attacks. It is important to keep your asthma well controlled so the condition does not interfere with your daily life.  What are the causes?  This condition is believed to be caused by inherited (genetic) and environmental factors, but its exact cause is not known.  What can trigger an asthma attack?  Many things can bring on an asthma attack or make symptoms worse. These triggers are different for every person. Common triggers include:  Allergens and irritants like mold, dust, pet dander, cockroaches, pollen, air pollution, and chemical odors.  Cigarette smoke.  Weather changes and cold air.  Stress and strong emotional responses such as crying or laughing hard.  Certain medications such as aspirin or beta blockers.  Infections and inflammatory conditions, such as the flu, a cold, pneumonia, or inflammation of the nasal membranes (rhinitis).  Gastroesophageal reflux disease (GERD).  What are the signs or symptoms?  Symptoms may occur right after exposure to an asthma trigger or hours later and can vary by person. Common signs and symptoms include:  Wheezing.  Trouble breathing (shortness of breath).  Excessive nighttime or early morning coughing.  Chest  tightness.  Tiredness (fatigue) with minimal activity.  Difficulty talking in complete sentences.  Poor exercise tolerance.  How is this diagnosed?  This condition is diagnosed based on:  A physical exam and your medical history.  Tests, which may include:  Lung function studies to evaluate the flow of air in your lungs.  Allergy tests.  Imaging tests, such as X-rays.  How is this treated?  There is no cure, but symptoms can be controlled with proper treatment. Treatment usually involves:  Identifying and avoiding your asthma triggers.  Inhaled medicines. Two types are commonly used to treat asthma, depending on severity:  Controller medicines. These help prevent asthma symptoms from occurring. They are taken every day.  Fast-acting reliever or rescue medicines. These quickly relieve asthma symptoms. They are used as needed and provide short-term relief.  Using other medicines, such as:  Allergy medicines, such as antihistamines, if your asthma attacks are triggered by allergens.  Immune medicines (immunomodulators). These are medicines that help control the immune system.  Using supplemental oxygen. This is only needed during a severe episode.  Creating an asthma action plan. An asthma action plan is a written plan for managing and treating your asthma attacks. This plan includes:  A list of your asthma triggers and how to avoid them.  Information about when medicines should be taken and when their dosage should be changed.  Instructions about using a device called a peak flow meter. A peak flow meter measures how well the lungs are working and the severity of your asthma. It helps you monitor your condition.  Follow these instructions at home:  Take over-the-counter and prescription medicines only as told by your health care provider.  Stay up to date on all vaccinations as recommended by your healthcare provider, including vaccines for the flu and pneumonia.  Use a peak flow meter and keep track of your peak flow  readings.  Understand and use your asthma action plan to address any asthma flares.  Do not smoke or allow anyone to smoke in your home.  Contact a health care provider if:  You have wheezing, shortness of breath, or a cough that is not responding to medicines.  Your medicines are causing side effects, such as a rash, itching, swelling, or trouble breathing.  You need to use a reliever medicine more than 2-3 times a week.  Your peak flow reading is still at 50-79% of your personal best after following your action plan for 1 hour.  You have a fever and shortness of breath.  Get help right away if:  You are getting worse and do not respond to treatment during an asthma attack.  You are short of breath when at rest or when doing very little physical activity.  You have difficulty eating, drinking, or talking.  You have chest pain or tightness.  You develop a fast heartbeat or palpitations.  You have a bluish color to your lips or fingernails.  You are light-headed or dizzy, or you faint.  Your peak flow reading is less than 50% of your personal best.  You feel too tired to breathe normally.  These symptoms may be an emergency. Get help right away. Call 911.  Do not wait to see if the symptoms will go away.  Do not drive yourself to the hospital.  Summary  Asthma is a long-term (chronic) condition that causes recurrent episodes in which the airways become tight and narrow. Asthma episodes, also called asthma attacks or asthma flares, can cause coughing, wheezing, shortness of breath, and chest pain.  Asthma cannot be cured, but medicines and lifestyle changes can help keep it well controlled and prevent asthma flares.  Make sure you understand how to avoid triggers and how and when to use your medicines.  Asthma attacks can range from minor to life-threatening. Get help right away if you have an asthma attack and do not respond to treatment with your usual rescue medicines.  This information is not intended to replace  advice given to you by your health care provider. Make sure you discuss any questions you have with your health care provider.  Document Revised: 10/05/2022 Document Reviewed: 09/26/2022  [employer] Patient Education © 2023 [employer]

## 2025-06-05 ENCOUNTER — OFFICE VISIT (OUTPATIENT)
Dept: URGENT CARE | Facility: CLINIC | Age: 35
End: 2025-06-05
Payer: COMMERCIAL

## 2025-06-05 VITALS
OXYGEN SATURATION: 96 % | RESPIRATION RATE: 20 BRPM | HEART RATE: 85 BPM | SYSTOLIC BLOOD PRESSURE: 138 MMHG | DIASTOLIC BLOOD PRESSURE: 88 MMHG | WEIGHT: 223 LBS | TEMPERATURE: 97.3 F | BODY MASS INDEX: 31.92 KG/M2 | HEIGHT: 70 IN

## 2025-06-05 DIAGNOSIS — Z82.49 FAMILY HISTORY OF ESSENTIAL HYPERTENSION: ICD-10-CM

## 2025-06-05 DIAGNOSIS — J45.991 COUGH VARIANT ASTHMA: ICD-10-CM

## 2025-06-05 DIAGNOSIS — R51.9 GENERALIZED HEADACHE: ICD-10-CM

## 2025-06-05 DIAGNOSIS — R03.0 ELEVATED BLOOD PRESSURE READING: Primary | ICD-10-CM

## 2025-06-05 DIAGNOSIS — J45.40 MODERATE PERSISTENT ASTHMA WITHOUT COMPLICATION: ICD-10-CM

## 2025-06-05 PROCEDURE — 99214 OFFICE O/P EST MOD 30 MIN: CPT | Performed by: NURSE PRACTITIONER

## 2025-06-05 PROCEDURE — 3079F DIAST BP 80-89 MM HG: CPT | Performed by: NURSE PRACTITIONER

## 2025-06-05 PROCEDURE — 3075F SYST BP GE 130 - 139MM HG: CPT | Performed by: NURSE PRACTITIONER

## 2025-06-05 RX ORDER — KETOROLAC TROMETHAMINE 15 MG/ML
15 INJECTION, SOLUTION INTRAMUSCULAR; INTRAVENOUS ONCE
Status: COMPLETED | OUTPATIENT
Start: 2025-06-05 | End: 2025-06-05

## 2025-06-05 RX ORDER — FLUTICASONE PROPIONATE AND SALMETEROL 100; 50 UG/1; UG/1
1 POWDER RESPIRATORY (INHALATION) EVERY 12 HOURS
Qty: 1 EACH | Refills: 0 | Status: SHIPPED | OUTPATIENT
Start: 2025-06-05

## 2025-06-05 RX ADMIN — KETOROLAC TROMETHAMINE 15 MG: 15 INJECTION, SOLUTION INTRAMUSCULAR; INTRAVENOUS at 09:00

## 2025-06-05 ASSESSMENT — FIBROSIS 4 INDEX: FIB4 SCORE: 0.41

## 2025-06-05 NOTE — PROGRESS NOTES
"Fortino Silvestre is a 34 y.o. male who presents for Headache (X9 days, elevated blood pressure, headache behind right eye)    HPI  This is a new problem. Fortino Silvestre is a 34 y.o. patient who presents to urgent care with c/o:  dull headache daily.   152-95. 136/94 on an upper arm cuff machine at home.   One cup of coffee a week ( avg) . No energy drinks.   Tx tried; tylenol, ubrelvy for his migraines.     ROS See HPI    Allergies:     Allergies[1]    PMSFS Hx:  Past Medical History[2]  Past Surgical History[3]  Family History   Problem Relation Age of Onset    Heart Disease Neg Hx     Stroke Neg Hx      Social History     Tobacco Use    Smoking status: Never    Smokeless tobacco: Never   Substance Use Topics    Alcohol use: Yes     Alcohol/week: 2.4 oz     Types: 4 Cans of beer per week     Comment: occasionally         Problems:   Problem List[4]    Medications:   Medications Ordered Prior to Encounter[5]     Objective:     /88 (BP Location: Left arm, Patient Position: Sitting, BP Cuff Size: Adult)   Pulse 85   Temp 36.3 °C (97.3 °F) (Temporal)   Resp 20   Ht 1.778 m (5' 10\")   Wt 101 kg (223 lb)   SpO2 96%   BMI 32.00 kg/m²     Physical Exam  Vitals and nursing note reviewed.   Constitutional:       Appearance: Normal appearance.   HENT:      Mouth/Throat:      Mouth: Mucous membranes are moist.   Cardiovascular:      Rate and Rhythm: Normal rate and regular rhythm.      Pulses: Normal pulses.      Heart sounds: Normal heart sounds.   Pulmonary:      Effort: Pulmonary effort is normal.      Breath sounds: Normal breath sounds.   Skin:     General: Skin is warm.      Capillary Refill: Capillary refill takes less than 2 seconds.   Neurological:      Mental Status: He is alert and oriented to person, place, and time.   Psychiatric:         Mood and Affect: Mood normal.         Behavior: Behavior normal.         Assessment /Associated Orders:      1. Elevated blood pressure reading  " Referral to establish with PCP      2. Family history of essential hypertension  Referral to establish with PCP      3. Moderate persistent asthma without complication  Referral to establish with PCP      4. Cough variant asthma  fluticasone-salmeterol (ADVAIR) 100-50 MCG/ACT AEROSOL POWDER, BREATH ACTIVATED      5. Generalized headache  ketorolac (Toradol) 15 MG/ML injection 15 mg            Medical Decision Making:    Fortino Silvestre is a very pleasant 34 y.o. male who is clinically stable at today's acute urgent care visit. Presents with acute problem/ concern today.    No acute distress is noted at the time of the visit.  VSS. Appropriate for outpatient care at this time.   Elevated blood pressure readings at home. Normal today. + family hx of HTN. No recent PCP appt or physical. Referral placed.   Headache could be secondary to elevated BP or non-specific Gen headache. Tx with Toradol. Has had this in the past that has really helped him with his headaches.     Referred to  primary care provider for monitoring and management. Educated in end organ effects of uncontrolled BP  including MI, CVA, Blindness, CRF and death. Educated in TLC's.  Advised close monitoring. Keep a log of home BP readings and take to PCP appt.   Educated in proper administration of  prescription medication(s) ordered today including safety, possible SE, risks, benefits, rationale and alternatives to therapy.   Toradol given in clinic today. Tolerated well without adverse effects. Advised not to take NSAIDS for 6-8 hours post injection.   Resume all prior  RX medications. Take as prescribed.         Through shared decision making a discussion of the Dx and DDx, management options (risks,benefits, and alternatives to planned treatment), natural progression, supportive care and indications for immediate follow-up discussed. Expressed understanding and the treatment plan was agreed upon.    Questions were encouraged and answered      Follow Up:   Schedule appt with PCP - referral placed.   Return to urgent care prn if new or worsening sx or if there is no improvement in condition prn.    Educated in Red flags and indications to immediately call 911 or present to the Emergency Department.             Please note that this dictation was created using voice recognition software. I have worked with consultants from the vendor as well as technical experts from [facility] to optimize the interface. I have made every reasonable attempt to correct obvious errors, but I expect that there are errors of grammar and possibly content that I did not discover before finalizing the note.  This note was electronically signed by provider           [1] No Known Allergies  [2]   Past Medical History:  Diagnosis Date    Asthma    [3] History reviewed. No pertinent surgical history.  [4]   Patient Active Problem List  Diagnosis    Migraine with aura    Testicular lump    Migraine with aura and without status migrainosus, not intractable    Medication overuse headache    At risk for obstructive sleep apnea    SHAZIA (obstructive sleep apnea)    Moderate persistent asthma without complication   [5]   Current Outpatient Medications on File Prior to Visit   Medication Sig Dispense Refill    Albuterol Sulfate, sensor, 108 (90 Base) MCG/ACT AEROSOL POWDER, BREATH ACTIVATED Inhale 2 puffs every 4 hours by inhalation route.      amitriptyline (ELAVIL) 10 MG Tab Take 1 Tablet by mouth at bedtime.      albuterol 108 (90 Base) MCG/ACT Aero Soln inhalation aerosol Inhale 1-2 Puffs every four hours as needed for Shortness of Breath. 1 Each 3    fluticasone-salmeterol (ADVAIR) 100-50 MCG/ACT AEROSOL POWDER, BREATH ACTIVATED Inhale 1 Puff every 12 hours. 1 Each 2     No current facility-administered medications on file prior to visit.